# Patient Record
Sex: MALE | Race: WHITE | Employment: OTHER | URBAN - METROPOLITAN AREA
[De-identification: names, ages, dates, MRNs, and addresses within clinical notes are randomized per-mention and may not be internally consistent; named-entity substitution may affect disease eponyms.]

---

## 2017-01-18 ENCOUNTER — OFFICE VISIT (OUTPATIENT)
Dept: INTERNAL MEDICINE CLINIC | Age: 82
End: 2017-01-18

## 2017-01-18 VITALS
BODY MASS INDEX: 27 KG/M2 | DIASTOLIC BLOOD PRESSURE: 60 MMHG | WEIGHT: 168 LBS | HEART RATE: 62 BPM | OXYGEN SATURATION: 98 % | SYSTOLIC BLOOD PRESSURE: 130 MMHG | HEIGHT: 66 IN

## 2017-01-18 DIAGNOSIS — I48.20 CHRONIC ATRIAL FIBRILLATION (HCC): ICD-10-CM

## 2017-01-18 DIAGNOSIS — I10 ESSENTIAL HYPERTENSION, BENIGN: ICD-10-CM

## 2017-01-18 DIAGNOSIS — K21.9 GASTROESOPHAGEAL REFLUX DISEASE WITHOUT ESOPHAGITIS: ICD-10-CM

## 2017-01-18 DIAGNOSIS — I50.22 CHRONIC SYSTOLIC CONGESTIVE HEART FAILURE (HCC): Primary | ICD-10-CM

## 2017-01-18 DIAGNOSIS — G40.909 SEIZURE DISORDER (HCC): ICD-10-CM

## 2017-01-18 PROCEDURE — 1123F ACP DISCUSS/DSCN MKR DOCD: CPT | Performed by: INTERNAL MEDICINE

## 2017-01-18 PROCEDURE — 4040F PNEUMOC VAC/ADMIN/RCVD: CPT | Performed by: INTERNAL MEDICINE

## 2017-01-18 PROCEDURE — G8599 NO ASA/ANTIPLAT THER USE RNG: HCPCS | Performed by: INTERNAL MEDICINE

## 2017-01-18 PROCEDURE — G8420 CALC BMI NORM PARAMETERS: HCPCS | Performed by: INTERNAL MEDICINE

## 2017-01-18 PROCEDURE — 90670 PCV13 VACCINE IM: CPT | Performed by: INTERNAL MEDICINE

## 2017-01-18 PROCEDURE — G0009 ADMIN PNEUMOCOCCAL VACCINE: HCPCS | Performed by: INTERNAL MEDICINE

## 2017-01-18 PROCEDURE — G8427 DOCREV CUR MEDS BY ELIG CLIN: HCPCS | Performed by: INTERNAL MEDICINE

## 2017-01-18 PROCEDURE — 99214 OFFICE O/P EST MOD 30 MIN: CPT | Performed by: INTERNAL MEDICINE

## 2017-01-18 PROCEDURE — 1036F TOBACCO NON-USER: CPT | Performed by: INTERNAL MEDICINE

## 2017-01-18 PROCEDURE — G8484 FLU IMMUNIZE NO ADMIN: HCPCS | Performed by: INTERNAL MEDICINE

## 2017-01-18 RX ORDER — POTASSIUM CHLORIDE 1.5 G/1.77G
20 POWDER, FOR SOLUTION ORAL 2 TIMES DAILY
COMMUNITY
End: 2017-12-07 | Stop reason: SDUPTHER

## 2017-01-18 ASSESSMENT — ENCOUNTER SYMPTOMS
TROUBLE SWALLOWING: 0
WHEEZING: 0
GASTROINTESTINAL NEGATIVE: 1
CHEST TIGHTNESS: 0
COUGH: 0
SHORTNESS OF BREATH: 0

## 2017-01-25 RX ORDER — LEVETIRACETAM 500 MG/1
TABLET ORAL
Qty: 60 TABLET | Refills: 3 | Status: SHIPPED | OUTPATIENT
Start: 2017-01-25 | End: 2017-05-25 | Stop reason: SDUPTHER

## 2017-02-02 ENCOUNTER — TELEPHONE (OUTPATIENT)
Dept: INTERNAL MEDICINE CLINIC | Age: 82
End: 2017-02-02

## 2017-02-09 RX ORDER — ESCITALOPRAM OXALATE 20 MG/1
TABLET ORAL
Qty: 30 TABLET | Refills: 2 | Status: SHIPPED | OUTPATIENT
Start: 2017-02-09 | End: 2017-05-10 | Stop reason: SDUPTHER

## 2017-02-14 ENCOUNTER — ANTI-COAG VISIT (OUTPATIENT)
Dept: INTERNAL MEDICINE CLINIC | Age: 82
End: 2017-02-14

## 2017-03-22 ENCOUNTER — OFFICE VISIT (OUTPATIENT)
Dept: INTERNAL MEDICINE CLINIC | Age: 82
End: 2017-03-22

## 2017-03-22 VITALS
DIASTOLIC BLOOD PRESSURE: 60 MMHG | RESPIRATION RATE: 16 BRPM | WEIGHT: 174 LBS | OXYGEN SATURATION: 97 % | SYSTOLIC BLOOD PRESSURE: 130 MMHG | HEART RATE: 67 BPM | BODY MASS INDEX: 27.97 KG/M2 | HEIGHT: 66 IN

## 2017-03-22 DIAGNOSIS — I10 ESSENTIAL HYPERTENSION, BENIGN: Primary | ICD-10-CM

## 2017-03-22 DIAGNOSIS — I50.22 CHRONIC SYSTOLIC CONGESTIVE HEART FAILURE (HCC): ICD-10-CM

## 2017-03-22 DIAGNOSIS — I48.20 CHRONIC ATRIAL FIBRILLATION (HCC): ICD-10-CM

## 2017-03-22 DIAGNOSIS — R73.9 HYPERGLYCEMIA: ICD-10-CM

## 2017-03-22 DIAGNOSIS — G40.909 SEIZURE DISORDER (HCC): ICD-10-CM

## 2017-03-22 LAB
ANION GAP SERPL CALCULATED.3IONS-SCNC: 13 MMOL/L (ref 3–16)
BASOPHILS ABSOLUTE: 0.1 K/UL (ref 0–0.2)
BASOPHILS RELATIVE PERCENT: 0.6 %
BUN BLDV-MCNC: 22 MG/DL (ref 7–20)
CALCIUM SERPL-MCNC: 9.1 MG/DL (ref 8.3–10.6)
CHLORIDE BLD-SCNC: 100 MMOL/L (ref 99–110)
CO2: 27 MMOL/L (ref 21–32)
CREAT SERPL-MCNC: 0.7 MG/DL (ref 0.8–1.3)
EOSINOPHILS ABSOLUTE: 0.4 K/UL (ref 0–0.6)
EOSINOPHILS RELATIVE PERCENT: 3.9 %
GFR AFRICAN AMERICAN: >60
GFR NON-AFRICAN AMERICAN: >60
GLUCOSE BLD-MCNC: 117 MG/DL (ref 70–99)
HBA1C MFR BLD: 5.6 %
HCT VFR BLD CALC: 39.1 % (ref 40.5–52.5)
HEMOGLOBIN: 12.6 G/DL (ref 13.5–17.5)
LYMPHOCYTES ABSOLUTE: 2.3 K/UL (ref 1–5.1)
LYMPHOCYTES RELATIVE PERCENT: 23.9 %
MCH RBC QN AUTO: 30.6 PG (ref 26–34)
MCHC RBC AUTO-ENTMCNC: 32.2 G/DL (ref 31–36)
MCV RBC AUTO: 95 FL (ref 80–100)
MONOCYTES ABSOLUTE: 1.4 K/UL (ref 0–1.3)
MONOCYTES RELATIVE PERCENT: 13.9 %
NEUTROPHILS ABSOLUTE: 5.6 K/UL (ref 1.7–7.7)
NEUTROPHILS RELATIVE PERCENT: 57.7 %
PDW BLD-RTO: 15.3 % (ref 12.4–15.4)
PLATELET # BLD: 245 K/UL (ref 135–450)
PMV BLD AUTO: 8.3 FL (ref 5–10.5)
POTASSIUM SERPL-SCNC: 5 MMOL/L (ref 3.5–5.1)
RBC # BLD: 4.12 M/UL (ref 4.2–5.9)
SODIUM BLD-SCNC: 140 MMOL/L (ref 136–145)
TSH SERPL DL<=0.05 MIU/L-ACNC: 1.39 UIU/ML (ref 0.27–4.2)
WBC # BLD: 9.8 K/UL (ref 4–11)

## 2017-03-22 PROCEDURE — G8599 NO ASA/ANTIPLAT THER USE RNG: HCPCS | Performed by: INTERNAL MEDICINE

## 2017-03-22 PROCEDURE — 1036F TOBACCO NON-USER: CPT | Performed by: INTERNAL MEDICINE

## 2017-03-22 PROCEDURE — 99214 OFFICE O/P EST MOD 30 MIN: CPT | Performed by: INTERNAL MEDICINE

## 2017-03-22 PROCEDURE — G8484 FLU IMMUNIZE NO ADMIN: HCPCS | Performed by: INTERNAL MEDICINE

## 2017-03-22 PROCEDURE — G8420 CALC BMI NORM PARAMETERS: HCPCS | Performed by: INTERNAL MEDICINE

## 2017-03-22 PROCEDURE — G8427 DOCREV CUR MEDS BY ELIG CLIN: HCPCS | Performed by: INTERNAL MEDICINE

## 2017-03-22 PROCEDURE — 4040F PNEUMOC VAC/ADMIN/RCVD: CPT | Performed by: INTERNAL MEDICINE

## 2017-03-22 PROCEDURE — 1123F ACP DISCUSS/DSCN MKR DOCD: CPT | Performed by: INTERNAL MEDICINE

## 2017-03-22 PROCEDURE — 36415 COLL VENOUS BLD VENIPUNCTURE: CPT | Performed by: INTERNAL MEDICINE

## 2017-03-22 PROCEDURE — 83036 HEMOGLOBIN GLYCOSYLATED A1C: CPT | Performed by: INTERNAL MEDICINE

## 2017-03-22 ASSESSMENT — ENCOUNTER SYMPTOMS
SHORTNESS OF BREATH: 0
SORE THROAT: 0
COUGH: 1
CHEST TIGHTNESS: 0
GASTROINTESTINAL NEGATIVE: 1
WHEEZING: 0
SINUS PRESSURE: 0

## 2017-04-19 ENCOUNTER — TELEPHONE (OUTPATIENT)
Dept: INTERNAL MEDICINE CLINIC | Age: 82
End: 2017-04-19

## 2017-04-19 RX ORDER — OMEPRAZOLE 20 MG/1
20 CAPSULE, DELAYED RELEASE ORAL DAILY
Qty: 90 CAPSULE | Refills: 1 | Status: SHIPPED | OUTPATIENT
Start: 2017-04-19 | End: 2017-08-30 | Stop reason: SDUPTHER

## 2017-05-10 RX ORDER — ESCITALOPRAM OXALATE 20 MG/1
TABLET ORAL
Qty: 30 TABLET | Refills: 3 | Status: SHIPPED | OUTPATIENT
Start: 2017-05-10 | End: 2017-08-31 | Stop reason: SDUPTHER

## 2017-05-25 RX ORDER — LEVETIRACETAM 500 MG/1
TABLET ORAL
Qty: 60 TABLET | Refills: 3 | Status: SHIPPED | OUTPATIENT
Start: 2017-05-25 | End: 2017-10-05 | Stop reason: SDUPTHER

## 2017-05-26 ENCOUNTER — OFFICE VISIT (OUTPATIENT)
Dept: INTERNAL MEDICINE CLINIC | Age: 82
End: 2017-05-26

## 2017-05-26 VITALS
SYSTOLIC BLOOD PRESSURE: 120 MMHG | WEIGHT: 153 LBS | OXYGEN SATURATION: 98 % | RESPIRATION RATE: 16 BRPM | DIASTOLIC BLOOD PRESSURE: 70 MMHG | BODY MASS INDEX: 24.69 KG/M2 | HEART RATE: 60 BPM

## 2017-05-26 DIAGNOSIS — I10 ESSENTIAL HYPERTENSION, BENIGN: Primary | ICD-10-CM

## 2017-05-26 DIAGNOSIS — K21.9 GASTROESOPHAGEAL REFLUX DISEASE WITHOUT ESOPHAGITIS: ICD-10-CM

## 2017-05-26 DIAGNOSIS — I50.22 CHRONIC SYSTOLIC CONGESTIVE HEART FAILURE (HCC): ICD-10-CM

## 2017-05-26 DIAGNOSIS — I48.0 PAROXYSMAL ATRIAL FIBRILLATION (HCC): ICD-10-CM

## 2017-05-26 PROCEDURE — 4040F PNEUMOC VAC/ADMIN/RCVD: CPT | Performed by: INTERNAL MEDICINE

## 2017-05-26 PROCEDURE — G8427 DOCREV CUR MEDS BY ELIG CLIN: HCPCS | Performed by: INTERNAL MEDICINE

## 2017-05-26 PROCEDURE — G8420 CALC BMI NORM PARAMETERS: HCPCS | Performed by: INTERNAL MEDICINE

## 2017-05-26 PROCEDURE — 1036F TOBACCO NON-USER: CPT | Performed by: INTERNAL MEDICINE

## 2017-05-26 PROCEDURE — 1123F ACP DISCUSS/DSCN MKR DOCD: CPT | Performed by: INTERNAL MEDICINE

## 2017-05-26 PROCEDURE — 99214 OFFICE O/P EST MOD 30 MIN: CPT | Performed by: INTERNAL MEDICINE

## 2017-05-26 PROCEDURE — G8599 NO ASA/ANTIPLAT THER USE RNG: HCPCS | Performed by: INTERNAL MEDICINE

## 2017-05-26 ASSESSMENT — ENCOUNTER SYMPTOMS
WHEEZING: 0
SHORTNESS OF BREATH: 0
CHEST TIGHTNESS: 0
TROUBLE SWALLOWING: 0
SORE THROAT: 0
COUGH: 0
GASTROINTESTINAL NEGATIVE: 1

## 2017-06-21 ENCOUNTER — TELEPHONE (OUTPATIENT)
Dept: INTERNAL MEDICINE CLINIC | Age: 82
End: 2017-06-21

## 2017-07-24 ENCOUNTER — CARE COORDINATION (OUTPATIENT)
Dept: CARE COORDINATION | Age: 82
End: 2017-07-24

## 2017-08-01 ENCOUNTER — CARE COORDINATION (OUTPATIENT)
Dept: CARE COORDINATION | Age: 82
End: 2017-08-01

## 2017-08-02 ENCOUNTER — CARE COORDINATION (OUTPATIENT)
Dept: CARE COORDINATION | Age: 82
End: 2017-08-02

## 2017-08-10 ENCOUNTER — CARE COORDINATION (OUTPATIENT)
Dept: CARE COORDINATION | Age: 82
End: 2017-08-10

## 2017-08-11 ENCOUNTER — TELEPHONE (OUTPATIENT)
Dept: INTERNAL MEDICINE CLINIC | Age: 82
End: 2017-08-11

## 2017-08-11 DIAGNOSIS — H91.90 HEARING LOSS, UNSPECIFIED HEARING LOSS TYPE, UNSPECIFIED LATERALITY: Primary | ICD-10-CM

## 2017-08-18 ENCOUNTER — CARE COORDINATION (OUTPATIENT)
Dept: CARE COORDINATION | Age: 82
End: 2017-08-18

## 2017-08-30 ENCOUNTER — OFFICE VISIT (OUTPATIENT)
Dept: INTERNAL MEDICINE CLINIC | Age: 82
End: 2017-08-30

## 2017-08-30 VITALS
HEART RATE: 70 BPM | OXYGEN SATURATION: 96 % | SYSTOLIC BLOOD PRESSURE: 120 MMHG | HEIGHT: 67 IN | BODY MASS INDEX: 28.88 KG/M2 | WEIGHT: 184 LBS | DIASTOLIC BLOOD PRESSURE: 60 MMHG | RESPIRATION RATE: 16 BRPM

## 2017-08-30 DIAGNOSIS — G40.909 SEIZURE DISORDER (HCC): ICD-10-CM

## 2017-08-30 DIAGNOSIS — I50.22 CHRONIC SYSTOLIC CONGESTIVE HEART FAILURE (HCC): ICD-10-CM

## 2017-08-30 DIAGNOSIS — I25.10 CORONARY ARTERY DISEASE INVOLVING NATIVE CORONARY ARTERY OF NATIVE HEART WITHOUT ANGINA PECTORIS: ICD-10-CM

## 2017-08-30 DIAGNOSIS — S06.5XAA SDH (SUBDURAL HEMATOMA): ICD-10-CM

## 2017-08-30 DIAGNOSIS — I10 ESSENTIAL HYPERTENSION, BENIGN: ICD-10-CM

## 2017-08-30 DIAGNOSIS — I48.0 PAROXYSMAL ATRIAL FIBRILLATION (HCC): Primary | ICD-10-CM

## 2017-08-30 LAB
ANION GAP SERPL CALCULATED.3IONS-SCNC: 12 MMOL/L (ref 3–16)
BASOPHILS ABSOLUTE: 0.1 K/UL (ref 0–0.2)
BASOPHILS RELATIVE PERCENT: 0.9 %
BUN BLDV-MCNC: 21 MG/DL (ref 7–20)
CALCIUM SERPL-MCNC: 9.2 MG/DL (ref 8.3–10.6)
CHLORIDE BLD-SCNC: 104 MMOL/L (ref 99–110)
CO2: 27 MMOL/L (ref 21–32)
CREAT SERPL-MCNC: 0.7 MG/DL (ref 0.8–1.3)
EOSINOPHILS ABSOLUTE: 0.6 K/UL (ref 0–0.6)
EOSINOPHILS RELATIVE PERCENT: 5.8 %
GFR AFRICAN AMERICAN: >60
GFR NON-AFRICAN AMERICAN: >60
GLUCOSE BLD-MCNC: 97 MG/DL (ref 70–99)
HCT VFR BLD CALC: 38.1 % (ref 40.5–52.5)
HEMOGLOBIN: 12.7 G/DL (ref 13.5–17.5)
LYMPHOCYTES ABSOLUTE: 2.4 K/UL (ref 1–5.1)
LYMPHOCYTES RELATIVE PERCENT: 22.1 %
MAGNESIUM: 2.2 MG/DL (ref 1.8–2.4)
MCH RBC QN AUTO: 31.9 PG (ref 26–34)
MCHC RBC AUTO-ENTMCNC: 33.3 G/DL (ref 31–36)
MCV RBC AUTO: 95.8 FL (ref 80–100)
MONOCYTES ABSOLUTE: 1.4 K/UL (ref 0–1.3)
MONOCYTES RELATIVE PERCENT: 12.9 %
NEUTROPHILS ABSOLUTE: 6.3 K/UL (ref 1.7–7.7)
NEUTROPHILS RELATIVE PERCENT: 58.3 %
PDW BLD-RTO: 14.2 % (ref 12.4–15.4)
PLATELET # BLD: 250 K/UL (ref 135–450)
PMV BLD AUTO: 7.8 FL (ref 5–10.5)
POTASSIUM SERPL-SCNC: 5.3 MMOL/L (ref 3.5–5.1)
RBC # BLD: 3.98 M/UL (ref 4.2–5.9)
SODIUM BLD-SCNC: 143 MMOL/L (ref 136–145)
T4 FREE: 1.3 NG/DL (ref 0.9–1.8)
TSH SERPL DL<=0.05 MIU/L-ACNC: 1.45 UIU/ML (ref 0.27–4.2)
WBC # BLD: 10.7 K/UL (ref 4–11)

## 2017-08-30 PROCEDURE — G8599 NO ASA/ANTIPLAT THER USE RNG: HCPCS | Performed by: INTERNAL MEDICINE

## 2017-08-30 PROCEDURE — 36415 COLL VENOUS BLD VENIPUNCTURE: CPT | Performed by: INTERNAL MEDICINE

## 2017-08-30 PROCEDURE — G8419 CALC BMI OUT NRM PARAM NOF/U: HCPCS | Performed by: INTERNAL MEDICINE

## 2017-08-30 PROCEDURE — 1123F ACP DISCUSS/DSCN MKR DOCD: CPT | Performed by: INTERNAL MEDICINE

## 2017-08-30 PROCEDURE — G8427 DOCREV CUR MEDS BY ELIG CLIN: HCPCS | Performed by: INTERNAL MEDICINE

## 2017-08-30 PROCEDURE — G8510 SCR DEP NEG, NO PLAN REQD: HCPCS | Performed by: INTERNAL MEDICINE

## 2017-08-30 PROCEDURE — 99214 OFFICE O/P EST MOD 30 MIN: CPT | Performed by: INTERNAL MEDICINE

## 2017-08-30 PROCEDURE — 1036F TOBACCO NON-USER: CPT | Performed by: INTERNAL MEDICINE

## 2017-08-30 PROCEDURE — 4040F PNEUMOC VAC/ADMIN/RCVD: CPT | Performed by: INTERNAL MEDICINE

## 2017-08-30 RX ORDER — OMEPRAZOLE 20 MG/1
20 CAPSULE, DELAYED RELEASE ORAL DAILY
Qty: 90 CAPSULE | Refills: 1 | Status: SHIPPED | OUTPATIENT
Start: 2017-08-30 | End: 2018-04-17 | Stop reason: SDUPTHER

## 2017-08-30 ASSESSMENT — ENCOUNTER SYMPTOMS
SHORTNESS OF BREATH: 0
COUGH: 0
SORE THROAT: 0
CHEST TIGHTNESS: 0
WHEEZING: 0
VOICE CHANGE: 0
GASTROINTESTINAL NEGATIVE: 1

## 2017-08-30 ASSESSMENT — PATIENT HEALTH QUESTIONNAIRE - PHQ9
1. LITTLE INTEREST OR PLEASURE IN DOING THINGS: 0
SUM OF ALL RESPONSES TO PHQ QUESTIONS 1-9: 0
2. FEELING DOWN, DEPRESSED OR HOPELESS: 0
SUM OF ALL RESPONSES TO PHQ9 QUESTIONS 1 & 2: 0

## 2017-08-31 RX ORDER — ESCITALOPRAM OXALATE 20 MG/1
TABLET, FILM COATED ORAL
Qty: 30 TABLET | Refills: 3 | Status: SHIPPED | OUTPATIENT
Start: 2017-08-31 | End: 2018-01-03 | Stop reason: SDUPTHER

## 2017-09-06 ENCOUNTER — HOSPITAL ENCOUNTER (OUTPATIENT)
Dept: OTHER | Age: 82
Discharge: HOME | End: 2017-09-30
Attending: INTERNAL MEDICINE | Admitting: INTERNAL MEDICINE
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
## 2017-09-13 ENCOUNTER — TELEPHONE (OUTPATIENT)
Dept: INTERNAL MEDICINE CLINIC | Age: 82
End: 2017-09-13

## 2017-09-13 RX ORDER — METHYLPREDNISOLONE 4 MG/1
TABLET ORAL
Qty: 1 KIT | Refills: 0 | Status: SHIPPED | OUTPATIENT
Start: 2017-09-13 | End: 2017-09-19

## 2017-10-05 ENCOUNTER — TELEPHONE (OUTPATIENT)
Dept: INTERNAL MEDICINE CLINIC | Age: 82
End: 2017-10-05

## 2017-10-05 RX ORDER — LEVETIRACETAM 500 MG/1
TABLET ORAL
Qty: 60 TABLET | Status: CANCELLED | OUTPATIENT
Start: 2017-10-05

## 2017-10-05 RX ORDER — LEVETIRACETAM 500 MG/1
TABLET ORAL
Qty: 60 TABLET | Refills: 3 | Status: SHIPPED | OUTPATIENT
Start: 2017-10-05 | End: 2018-01-23 | Stop reason: SDUPTHER

## 2017-12-06 ENCOUNTER — OFFICE VISIT (OUTPATIENT)
Dept: INTERNAL MEDICINE CLINIC | Age: 82
End: 2017-12-06

## 2017-12-06 VITALS
HEART RATE: 86 BPM | OXYGEN SATURATION: 94 % | DIASTOLIC BLOOD PRESSURE: 70 MMHG | BODY MASS INDEX: 28.72 KG/M2 | RESPIRATION RATE: 16 BRPM | SYSTOLIC BLOOD PRESSURE: 120 MMHG | HEIGHT: 67 IN | WEIGHT: 183 LBS

## 2017-12-06 DIAGNOSIS — I10 ESSENTIAL HYPERTENSION, BENIGN: ICD-10-CM

## 2017-12-06 DIAGNOSIS — I48.20 CHRONIC ATRIAL FIBRILLATION (HCC): ICD-10-CM

## 2017-12-06 DIAGNOSIS — Z23 NEED FOR INFLUENZA VACCINATION: ICD-10-CM

## 2017-12-06 DIAGNOSIS — I50.22 CHRONIC SYSTOLIC CONGESTIVE HEART FAILURE (HCC): Primary | ICD-10-CM

## 2017-12-06 DIAGNOSIS — I25.10 CORONARY ARTERY DISEASE INVOLVING NATIVE CORONARY ARTERY OF NATIVE HEART WITHOUT ANGINA PECTORIS: ICD-10-CM

## 2017-12-06 DIAGNOSIS — K21.9 GASTROESOPHAGEAL REFLUX DISEASE WITHOUT ESOPHAGITIS: ICD-10-CM

## 2017-12-06 DIAGNOSIS — G40.909 SEIZURE DISORDER (HCC): ICD-10-CM

## 2017-12-06 LAB
ANION GAP SERPL CALCULATED.3IONS-SCNC: 12 MMOL/L (ref 3–16)
BASOPHILS ABSOLUTE: 0.1 K/UL (ref 0–0.2)
BASOPHILS RELATIVE PERCENT: 0.8 %
BUN BLDV-MCNC: 19 MG/DL (ref 7–20)
CALCIUM SERPL-MCNC: 9.1 MG/DL (ref 8.3–10.6)
CHLORIDE BLD-SCNC: 104 MMOL/L (ref 99–110)
CO2: 27 MMOL/L (ref 21–32)
CREAT SERPL-MCNC: 0.8 MG/DL (ref 0.8–1.3)
EOSINOPHILS ABSOLUTE: 0.4 K/UL (ref 0–0.6)
EOSINOPHILS RELATIVE PERCENT: 4 %
GFR AFRICAN AMERICAN: >60
GFR NON-AFRICAN AMERICAN: >60
GLUCOSE BLD-MCNC: 109 MG/DL (ref 70–99)
HCT VFR BLD CALC: 39.7 % (ref 40.5–52.5)
HEMOGLOBIN: 13.2 G/DL (ref 13.5–17.5)
LYMPHOCYTES ABSOLUTE: 2.2 K/UL (ref 1–5.1)
LYMPHOCYTES RELATIVE PERCENT: 23.7 %
MCH RBC QN AUTO: 31.1 PG (ref 26–34)
MCHC RBC AUTO-ENTMCNC: 33.1 G/DL (ref 31–36)
MCV RBC AUTO: 93.9 FL (ref 80–100)
MONOCYTES ABSOLUTE: 1.3 K/UL (ref 0–1.3)
MONOCYTES RELATIVE PERCENT: 14 %
NEUTROPHILS ABSOLUTE: 5.3 K/UL (ref 1.7–7.7)
NEUTROPHILS RELATIVE PERCENT: 57.5 %
PDW BLD-RTO: 14.1 % (ref 12.4–15.4)
PLATELET # BLD: 231 K/UL (ref 135–450)
PMV BLD AUTO: 8.4 FL (ref 5–10.5)
POTASSIUM SERPL-SCNC: 5.5 MMOL/L (ref 3.5–5.1)
RBC # BLD: 4.23 M/UL (ref 4.2–5.9)
SODIUM BLD-SCNC: 143 MMOL/L (ref 136–145)
WBC # BLD: 9.2 K/UL (ref 4–11)

## 2017-12-06 PROCEDURE — G8419 CALC BMI OUT NRM PARAM NOF/U: HCPCS | Performed by: INTERNAL MEDICINE

## 2017-12-06 PROCEDURE — 4040F PNEUMOC VAC/ADMIN/RCVD: CPT | Performed by: INTERNAL MEDICINE

## 2017-12-06 PROCEDURE — 1123F ACP DISCUSS/DSCN MKR DOCD: CPT | Performed by: INTERNAL MEDICINE

## 2017-12-06 PROCEDURE — G8599 NO ASA/ANTIPLAT THER USE RNG: HCPCS | Performed by: INTERNAL MEDICINE

## 2017-12-06 PROCEDURE — 99214 OFFICE O/P EST MOD 30 MIN: CPT | Performed by: INTERNAL MEDICINE

## 2017-12-06 PROCEDURE — G8427 DOCREV CUR MEDS BY ELIG CLIN: HCPCS | Performed by: INTERNAL MEDICINE

## 2017-12-06 PROCEDURE — 90662 IIV NO PRSV INCREASED AG IM: CPT | Performed by: INTERNAL MEDICINE

## 2017-12-06 PROCEDURE — G0008 ADMIN INFLUENZA VIRUS VAC: HCPCS | Performed by: INTERNAL MEDICINE

## 2017-12-06 PROCEDURE — 36415 COLL VENOUS BLD VENIPUNCTURE: CPT | Performed by: INTERNAL MEDICINE

## 2017-12-06 PROCEDURE — G8484 FLU IMMUNIZE NO ADMIN: HCPCS | Performed by: INTERNAL MEDICINE

## 2017-12-06 PROCEDURE — 1036F TOBACCO NON-USER: CPT | Performed by: INTERNAL MEDICINE

## 2017-12-06 ASSESSMENT — ENCOUNTER SYMPTOMS
SHORTNESS OF BREATH: 0
COUGH: 1
CHEST TIGHTNESS: 0
GASTROINTESTINAL NEGATIVE: 1
WHEEZING: 0
TROUBLE SWALLOWING: 0
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
## 2017-12-07 RX ORDER — POTASSIUM CHLORIDE 1.5 G/1.77G
20 POWDER, FOR SOLUTION ORAL DAILY
Qty: 1 EACH | Refills: 0
Start: 2017-12-07 | End: 2020-01-01

## 2018-01-03 RX ORDER — ESCITALOPRAM OXALATE 20 MG/1
TABLET ORAL
Qty: 30 TABLET | Refills: 3 | Status: SHIPPED | OUTPATIENT
Start: 2018-01-03 | End: 2018-05-08 | Stop reason: SDUPTHER

## 2018-01-23 RX ORDER — LEVETIRACETAM 500 MG/1
TABLET ORAL
Qty: 60 TABLET | Refills: 5 | Status: SHIPPED | OUTPATIENT
Start: 2018-01-23 | End: 2018-07-31 | Stop reason: SDUPTHER

## 2018-02-13 ENCOUNTER — OFFICE VISIT (OUTPATIENT)
Dept: INTERNAL MEDICINE CLINIC | Age: 83
End: 2018-02-13

## 2018-02-13 VITALS
DIASTOLIC BLOOD PRESSURE: 74 MMHG | SYSTOLIC BLOOD PRESSURE: 128 MMHG | RESPIRATION RATE: 16 BRPM | HEART RATE: 64 BPM | OXYGEN SATURATION: 93 % | HEIGHT: 67 IN | BODY MASS INDEX: 28.56 KG/M2 | WEIGHT: 182 LBS

## 2018-02-13 DIAGNOSIS — I50.22 CHRONIC SYSTOLIC CONGESTIVE HEART FAILURE (HCC): ICD-10-CM

## 2018-02-13 DIAGNOSIS — S40.022A CONTUSION OF LEFT UPPER EXTREMITY, INITIAL ENCOUNTER: ICD-10-CM

## 2018-02-13 DIAGNOSIS — M77.8 LEFT SHOULDER TENDONITIS: ICD-10-CM

## 2018-02-13 DIAGNOSIS — G89.29 CHRONIC BILATERAL LOW BACK PAIN WITHOUT SCIATICA: ICD-10-CM

## 2018-02-13 DIAGNOSIS — I10 ESSENTIAL HYPERTENSION, BENIGN: ICD-10-CM

## 2018-02-13 DIAGNOSIS — I48.0 PAROXYSMAL ATRIAL FIBRILLATION (HCC): Primary | ICD-10-CM

## 2018-02-13 DIAGNOSIS — M54.50 CHRONIC BILATERAL LOW BACK PAIN WITHOUT SCIATICA: ICD-10-CM

## 2018-02-13 PROBLEM — M54.9 CHRONIC BILATERAL BACK PAIN: Status: ACTIVE | Noted: 2018-02-13

## 2018-02-13 PROCEDURE — 99214 OFFICE O/P EST MOD 30 MIN: CPT | Performed by: INTERNAL MEDICINE

## 2018-02-13 PROCEDURE — G8419 CALC BMI OUT NRM PARAM NOF/U: HCPCS | Performed by: INTERNAL MEDICINE

## 2018-02-13 PROCEDURE — 4040F PNEUMOC VAC/ADMIN/RCVD: CPT | Performed by: INTERNAL MEDICINE

## 2018-02-13 PROCEDURE — 1123F ACP DISCUSS/DSCN MKR DOCD: CPT | Performed by: INTERNAL MEDICINE

## 2018-02-13 PROCEDURE — G8599 NO ASA/ANTIPLAT THER USE RNG: HCPCS | Performed by: INTERNAL MEDICINE

## 2018-02-13 PROCEDURE — 1036F TOBACCO NON-USER: CPT | Performed by: INTERNAL MEDICINE

## 2018-02-13 PROCEDURE — G8484 FLU IMMUNIZE NO ADMIN: HCPCS | Performed by: INTERNAL MEDICINE

## 2018-02-13 PROCEDURE — G8427 DOCREV CUR MEDS BY ELIG CLIN: HCPCS | Performed by: INTERNAL MEDICINE

## 2018-02-13 ASSESSMENT — ENCOUNTER SYMPTOMS
GASTROINTESTINAL NEGATIVE: 1
SHORTNESS OF BREATH: 0
WHEEZING: 0
COUGH: 1
TROUBLE SWALLOWING: 0
CHEST TIGHTNESS: 0

## 2018-02-13 NOTE — PROGRESS NOTES
Subjective:      Patient ID: Juan Lowry is a 80 y.o. male. HPI  Has low back pain on and off ans is better now and increases with movement and has no injury  Has left shoulder pain for 3 weeks and increases with movement and had no injury  Has a lump left arm where he was given flu vaccine 3 months back  Has swelling of legs for 2 weeks and has no pain and has no fever or chills and has no drainage  Has no chest pain wheezing orthopnea or pnd  Has no gi or gu symptoms  Has no chest pain and has dry cough  Review of Systems   Constitutional: Negative for activity change, appetite change and unexpected weight change. HENT: Negative for trouble swallowing. Respiratory: Positive for cough. Negative for chest tightness, shortness of breath and wheezing. Cardiovascular: Positive for leg swelling. Negative for palpitations. Gastrointestinal: Negative. Genitourinary: Negative. Neurological: Negative for dizziness, light-headedness and headaches. Hematological: Does not bruise/bleed easily. Objective:   Physical Exam   Constitutional: He is oriented to person, place, and time. No distress. Eyes: Conjunctivae and EOM are normal. Pupils are equal, round, and reactive to light. No scleral icterus. Neck: No JVD present. No thyromegaly present. Cardiovascular: Normal rate, regular rhythm and normal heart sounds. Exam reveals no gallop. No murmur heard. Pulmonary/Chest: No respiratory distress. He has no wheezes. Rales: crackles at bases and decreased bs left base. Musculoskeletal: Edema: has 2+ edema of both legs with midl erythema and has no warmth or tenderness and has no open skin areas. Ha slower iliolumbar tenderness  And ha sno spinal tenderness  Has decreased rom in all directions left shoulder and has tenderness anterior shoulder capsule and deltoid bursal area and not over ac joint   Neurological: He is alert and oriented to person, place, and time.    Nursing note and

## 2018-04-02 ENCOUNTER — OFFICE VISIT (OUTPATIENT)
Dept: INTERNAL MEDICINE CLINIC | Age: 83
End: 2018-04-02

## 2018-04-02 VITALS
BODY MASS INDEX: 28.25 KG/M2 | RESPIRATION RATE: 16 BRPM | OXYGEN SATURATION: 95 % | DIASTOLIC BLOOD PRESSURE: 50 MMHG | HEART RATE: 65 BPM | WEIGHT: 180 LBS | SYSTOLIC BLOOD PRESSURE: 90 MMHG | HEIGHT: 67 IN

## 2018-04-02 DIAGNOSIS — I48.20 CHRONIC ATRIAL FIBRILLATION (HCC): ICD-10-CM

## 2018-04-02 DIAGNOSIS — I10 ESSENTIAL HYPERTENSION, BENIGN: ICD-10-CM

## 2018-04-02 DIAGNOSIS — G40.909 SEIZURE DISORDER (HCC): ICD-10-CM

## 2018-04-02 DIAGNOSIS — I50.22 CHRONIC SYSTOLIC CONGESTIVE HEART FAILURE (HCC): Primary | ICD-10-CM

## 2018-04-02 LAB
ANION GAP SERPL CALCULATED.3IONS-SCNC: 11 MMOL/L (ref 3–16)
BUN BLDV-MCNC: 23 MG/DL (ref 7–20)
CALCIUM SERPL-MCNC: 8.9 MG/DL (ref 8.3–10.6)
CHLORIDE BLD-SCNC: 102 MMOL/L (ref 99–110)
CO2: 29 MMOL/L (ref 21–32)
CREAT SERPL-MCNC: 0.8 MG/DL (ref 0.8–1.3)
GFR AFRICAN AMERICAN: >60
GFR NON-AFRICAN AMERICAN: >60
GLUCOSE BLD-MCNC: 108 MG/DL (ref 70–99)
POTASSIUM SERPL-SCNC: 5.1 MMOL/L (ref 3.5–5.1)
SODIUM BLD-SCNC: 142 MMOL/L (ref 136–145)

## 2018-04-02 PROCEDURE — G8599 NO ASA/ANTIPLAT THER USE RNG: HCPCS | Performed by: INTERNAL MEDICINE

## 2018-04-02 PROCEDURE — 1123F ACP DISCUSS/DSCN MKR DOCD: CPT | Performed by: INTERNAL MEDICINE

## 2018-04-02 PROCEDURE — G8419 CALC BMI OUT NRM PARAM NOF/U: HCPCS | Performed by: INTERNAL MEDICINE

## 2018-04-02 PROCEDURE — 4040F PNEUMOC VAC/ADMIN/RCVD: CPT | Performed by: INTERNAL MEDICINE

## 2018-04-02 PROCEDURE — 99213 OFFICE O/P EST LOW 20 MIN: CPT | Performed by: INTERNAL MEDICINE

## 2018-04-02 PROCEDURE — 1036F TOBACCO NON-USER: CPT | Performed by: INTERNAL MEDICINE

## 2018-04-02 PROCEDURE — G8427 DOCREV CUR MEDS BY ELIG CLIN: HCPCS | Performed by: INTERNAL MEDICINE

## 2018-04-02 PROCEDURE — 36415 COLL VENOUS BLD VENIPUNCTURE: CPT | Performed by: INTERNAL MEDICINE

## 2018-04-02 RX ORDER — FUROSEMIDE 20 MG/1
TABLET ORAL
Qty: 1 TABLET | Refills: 0 | Status: SHIPPED
Start: 2018-04-02 | End: 2018-09-26 | Stop reason: DRUGHIGH

## 2018-04-02 RX ORDER — M-VIT,TX,IRON,MINS/CALC/FOLIC 27MG-0.4MG
1 TABLET ORAL DAILY
COMMUNITY

## 2018-04-02 ASSESSMENT — ENCOUNTER SYMPTOMS
SHORTNESS OF BREATH: 0
WHEEZING: 0
GASTROINTESTINAL NEGATIVE: 1
COUGH: 0
CHEST TIGHTNESS: 0

## 2018-04-17 RX ORDER — OMEPRAZOLE 20 MG/1
20 CAPSULE, DELAYED RELEASE ORAL DAILY
Qty: 90 CAPSULE | Refills: 1 | Status: SHIPPED | OUTPATIENT
Start: 2018-04-17 | End: 2018-10-16 | Stop reason: SDUPTHER

## 2018-05-08 RX ORDER — ESCITALOPRAM OXALATE 20 MG/1
TABLET ORAL
Qty: 30 TABLET | Refills: 3 | Status: SHIPPED | OUTPATIENT
Start: 2018-05-08 | End: 2018-09-05 | Stop reason: SDUPTHER

## 2018-07-06 ENCOUNTER — OFFICE VISIT (OUTPATIENT)
Dept: INTERNAL MEDICINE CLINIC | Age: 83
End: 2018-07-06

## 2018-07-06 VITALS
RESPIRATION RATE: 16 BRPM | DIASTOLIC BLOOD PRESSURE: 64 MMHG | BODY MASS INDEX: 27.62 KG/M2 | HEART RATE: 65 BPM | SYSTOLIC BLOOD PRESSURE: 112 MMHG | OXYGEN SATURATION: 93 % | HEIGHT: 67 IN | WEIGHT: 176 LBS

## 2018-07-06 DIAGNOSIS — I50.22 CHRONIC SYSTOLIC CONGESTIVE HEART FAILURE (HCC): ICD-10-CM

## 2018-07-06 DIAGNOSIS — I10 ESSENTIAL HYPERTENSION, BENIGN: ICD-10-CM

## 2018-07-06 DIAGNOSIS — K21.9 GASTROESOPHAGEAL REFLUX DISEASE WITHOUT ESOPHAGITIS: ICD-10-CM

## 2018-07-06 DIAGNOSIS — I48.20 CHRONIC ATRIAL FIBRILLATION (HCC): Primary | ICD-10-CM

## 2018-07-06 LAB
ANION GAP SERPL CALCULATED.3IONS-SCNC: 12 MMOL/L (ref 3–16)
BASOPHILS ABSOLUTE: 0.1 K/UL (ref 0–0.2)
BASOPHILS RELATIVE PERCENT: 0.8 %
BUN BLDV-MCNC: 23 MG/DL (ref 7–20)
CALCIUM SERPL-MCNC: 9.2 MG/DL (ref 8.3–10.6)
CHLORIDE BLD-SCNC: 100 MMOL/L (ref 99–110)
CO2: 29 MMOL/L (ref 21–32)
CREAT SERPL-MCNC: 0.9 MG/DL (ref 0.8–1.3)
EOSINOPHILS ABSOLUTE: 0.4 K/UL (ref 0–0.6)
EOSINOPHILS RELATIVE PERCENT: 5 %
GFR AFRICAN AMERICAN: >60
GFR NON-AFRICAN AMERICAN: >60
GLUCOSE BLD-MCNC: 118 MG/DL (ref 70–99)
HCT VFR BLD CALC: 38.4 % (ref 40.5–52.5)
HEMOGLOBIN: 13.2 G/DL (ref 13.5–17.5)
LYMPHOCYTES ABSOLUTE: 1.8 K/UL (ref 1–5.1)
LYMPHOCYTES RELATIVE PERCENT: 21.3 %
MCH RBC QN AUTO: 31.6 PG (ref 26–34)
MCHC RBC AUTO-ENTMCNC: 34.4 G/DL (ref 31–36)
MCV RBC AUTO: 92 FL (ref 80–100)
MONOCYTES ABSOLUTE: 1.2 K/UL (ref 0–1.3)
MONOCYTES RELATIVE PERCENT: 14.4 %
NEUTROPHILS ABSOLUTE: 5 K/UL (ref 1.7–7.7)
NEUTROPHILS RELATIVE PERCENT: 58.5 %
PDW BLD-RTO: 13.2 % (ref 12.4–15.4)
PLATELET # BLD: 239 K/UL (ref 135–450)
PMV BLD AUTO: 8 FL (ref 5–10.5)
POTASSIUM SERPL-SCNC: 4.1 MMOL/L (ref 3.5–5.1)
RBC # BLD: 4.17 M/UL (ref 4.2–5.9)
SODIUM BLD-SCNC: 141 MMOL/L (ref 136–145)
TSH SERPL DL<=0.05 MIU/L-ACNC: 0.98 UIU/ML (ref 0.27–4.2)
WBC # BLD: 8.6 K/UL (ref 4–11)

## 2018-07-06 PROCEDURE — 1123F ACP DISCUSS/DSCN MKR DOCD: CPT | Performed by: INTERNAL MEDICINE

## 2018-07-06 PROCEDURE — 36415 COLL VENOUS BLD VENIPUNCTURE: CPT | Performed by: INTERNAL MEDICINE

## 2018-07-06 PROCEDURE — 4040F PNEUMOC VAC/ADMIN/RCVD: CPT | Performed by: INTERNAL MEDICINE

## 2018-07-06 PROCEDURE — G8599 NO ASA/ANTIPLAT THER USE RNG: HCPCS | Performed by: INTERNAL MEDICINE

## 2018-07-06 PROCEDURE — 1036F TOBACCO NON-USER: CPT | Performed by: INTERNAL MEDICINE

## 2018-07-06 PROCEDURE — G8427 DOCREV CUR MEDS BY ELIG CLIN: HCPCS | Performed by: INTERNAL MEDICINE

## 2018-07-06 PROCEDURE — 99214 OFFICE O/P EST MOD 30 MIN: CPT | Performed by: INTERNAL MEDICINE

## 2018-07-06 PROCEDURE — G8419 CALC BMI OUT NRM PARAM NOF/U: HCPCS | Performed by: INTERNAL MEDICINE

## 2018-07-06 ASSESSMENT — ENCOUNTER SYMPTOMS
CHEST TIGHTNESS: 0
SHORTNESS OF BREATH: 0
COUGH: 0
TROUBLE SWALLOWING: 0
WHEEZING: 0
GASTROINTESTINAL NEGATIVE: 1

## 2018-07-06 NOTE — PROGRESS NOTES
Subjective:      Patient ID: Julissa Gordon is a 80 y.o. male.htn cad chf    HPI  Feels ok and at times gets depressed due to health and not able to drive  Has no orthopnea or pnd and has no cough wheezing or chest pains  Has no gi or gu symptoms  Walks with walker and helps wife with small house chores  Has no pain in legs when he walks  Review of Systems   Constitutional: Positive for appetite change (not as good-has no teeth). Negative for activity change and unexpected weight change. HENT: Negative for trouble swallowing. Respiratory: Negative for cough, chest tightness, shortness of breath and wheezing. Cardiovascular: Negative for chest pain, palpitations and leg swelling. Gastrointestinal: Negative. Abdominal pain: has no heart burns. Genitourinary: Negative. Neurological: Negative for dizziness, light-headedness and headaches. has rash over abdomen with itching and has it for few weeks     Objective:   Physical Exam   Constitutional: He is oriented to person, place, and time. No distress. Eyes: Conjunctivae and EOM are normal. Pupils are equal, round, and reactive to light. No scleral icterus. Neck: No JVD present. No thyromegaly present. Cardiovascular: Normal rate, regular rhythm and normal heart sounds. Exam reveals no gallop. No murmur heard. Pulmonary/Chest: No respiratory distress. He has no wheezes. He has rales (left base). Musculoskeletal: He exhibits no edema. Lymphadenopathy:     He has no cervical adenopathy. Neurological: He is alert and oriented to person, place, and time. Nursing note and vitals reviewed.   has dry scaly erythematous rash seen over his abdomen and in patches    Assessment:      Skin rash from dry skin -using cortisone -otc and helping and using once daily   htn stable   chf well controlled  Cad stable  Atrial fib and in regular rhythm and has no signs of tia   Plan:      Use cortisone ointment 2 times daily till healed  Continue current

## 2018-07-31 RX ORDER — LEVETIRACETAM 500 MG/1
TABLET ORAL
Qty: 60 TABLET | Refills: 5 | Status: SHIPPED | OUTPATIENT
Start: 2018-07-31 | End: 2019-01-29 | Stop reason: SDUPTHER

## 2018-08-21 ENCOUNTER — TELEPHONE (OUTPATIENT)
Dept: INTERNAL MEDICINE CLINIC | Age: 83
End: 2018-08-21

## 2018-09-05 RX ORDER — ESCITALOPRAM OXALATE 20 MG/1
TABLET ORAL
Qty: 30 TABLET | Refills: 3 | Status: SHIPPED | OUTPATIENT
Start: 2018-09-05 | End: 2019-01-02 | Stop reason: SDUPTHER

## 2018-09-26 ENCOUNTER — OFFICE VISIT (OUTPATIENT)
Dept: INTERNAL MEDICINE CLINIC | Age: 83
End: 2018-09-26
Payer: MEDICARE

## 2018-09-26 VITALS
RESPIRATION RATE: 16 BRPM | BODY MASS INDEX: 27.94 KG/M2 | OXYGEN SATURATION: 97 % | SYSTOLIC BLOOD PRESSURE: 120 MMHG | WEIGHT: 178 LBS | HEART RATE: 60 BPM | HEIGHT: 67 IN | DIASTOLIC BLOOD PRESSURE: 60 MMHG

## 2018-09-26 DIAGNOSIS — Z23 NEED FOR INFLUENZA VACCINATION: ICD-10-CM

## 2018-09-26 DIAGNOSIS — L97.921 ULCER OF LEFT LOWER EXTREMITY, LIMITED TO BREAKDOWN OF SKIN (HCC): ICD-10-CM

## 2018-09-26 DIAGNOSIS — I50.42 CHRONIC COMBINED SYSTOLIC AND DIASTOLIC CONGESTIVE HEART FAILURE (HCC): ICD-10-CM

## 2018-09-26 DIAGNOSIS — I48.20 CHRONIC ATRIAL FIBRILLATION (HCC): ICD-10-CM

## 2018-09-26 DIAGNOSIS — I10 ESSENTIAL HYPERTENSION, BENIGN: Primary | ICD-10-CM

## 2018-09-26 PROCEDURE — 4040F PNEUMOC VAC/ADMIN/RCVD: CPT | Performed by: INTERNAL MEDICINE

## 2018-09-26 PROCEDURE — G8419 CALC BMI OUT NRM PARAM NOF/U: HCPCS | Performed by: INTERNAL MEDICINE

## 2018-09-26 PROCEDURE — 1101F PT FALLS ASSESS-DOCD LE1/YR: CPT | Performed by: INTERNAL MEDICINE

## 2018-09-26 PROCEDURE — 90662 IIV NO PRSV INCREASED AG IM: CPT | Performed by: INTERNAL MEDICINE

## 2018-09-26 PROCEDURE — 99214 OFFICE O/P EST MOD 30 MIN: CPT | Performed by: INTERNAL MEDICINE

## 2018-09-26 PROCEDURE — 1036F TOBACCO NON-USER: CPT | Performed by: INTERNAL MEDICINE

## 2018-09-26 PROCEDURE — G8599 NO ASA/ANTIPLAT THER USE RNG: HCPCS | Performed by: INTERNAL MEDICINE

## 2018-09-26 PROCEDURE — G0444 DEPRESSION SCREEN ANNUAL: HCPCS | Performed by: INTERNAL MEDICINE

## 2018-09-26 PROCEDURE — 1123F ACP DISCUSS/DSCN MKR DOCD: CPT | Performed by: INTERNAL MEDICINE

## 2018-09-26 PROCEDURE — G0008 ADMIN INFLUENZA VIRUS VAC: HCPCS | Performed by: INTERNAL MEDICINE

## 2018-09-26 PROCEDURE — G8427 DOCREV CUR MEDS BY ELIG CLIN: HCPCS | Performed by: INTERNAL MEDICINE

## 2018-09-26 RX ORDER — FUROSEMIDE 20 MG/1
TABLET ORAL
Qty: 1 TABLET | Refills: 0 | Status: SHIPPED
Start: 2018-09-26 | End: 2020-01-01

## 2018-09-26 ASSESSMENT — PATIENT HEALTH QUESTIONNAIRE - PHQ9
4. FEELING TIRED OR HAVING LITTLE ENERGY: 3
7. TROUBLE CONCENTRATING ON THINGS, SUCH AS READING THE NEWSPAPER OR WATCHING TELEVISION: 0
5. POOR APPETITE OR OVEREATING: 3
1. LITTLE INTEREST OR PLEASURE IN DOING THINGS: 0
2. FEELING DOWN, DEPRESSED OR HOPELESS: 3
SUM OF ALL RESPONSES TO PHQ QUESTIONS 1-9: 15
6. FEELING BAD ABOUT YOURSELF - OR THAT YOU ARE A FAILURE OR HAVE LET YOURSELF OR YOUR FAMILY DOWN: 3
8. MOVING OR SPEAKING SO SLOWLY THAT OTHER PEOPLE COULD HAVE NOTICED. OR THE OPPOSITE, BEING SO FIGETY OR RESTLESS THAT YOU HAVE BEEN MOVING AROUND A LOT MORE THAN USUAL: 0
SUM OF ALL RESPONSES TO PHQ QUESTIONS 1-9: 15
10. IF YOU CHECKED OFF ANY PROBLEMS, HOW DIFFICULT HAVE THESE PROBLEMS MADE IT FOR YOU TO DO YOUR WORK, TAKE CARE OF THINGS AT HOME, OR GET ALONG WITH OTHER PEOPLE: 0
SUM OF ALL RESPONSES TO PHQ9 QUESTIONS 1 & 2: 3
3. TROUBLE FALLING OR STAYING ASLEEP: 3
9. THOUGHTS THAT YOU WOULD BE BETTER OFF DEAD, OR OF HURTING YOURSELF: 0

## 2018-09-26 ASSESSMENT — ENCOUNTER SYMPTOMS
COUGH: 1
CHEST TIGHTNESS: 0
GASTROINTESTINAL NEGATIVE: 1
SHORTNESS OF BREATH: 0
WHEEZING: 0

## 2018-09-26 NOTE — PROGRESS NOTES
Vaccine Information Sheet, \"Influenza - Inactivated\"  given to Benedict Lowery, or parent/legal guardian of  Benedict Lowery and verbalized understanding. Patient responses:    Have you ever had a reaction to a flu vaccine? No  Are you able to eat eggs without adverse effects? Yes  Do you have any current illness? No  Have you ever had Guillian Munson Syndrome? No    Flu vaccine given per order. Please see immunization tab.

## 2018-09-29 LAB
GRAM STAIN RESULT: ABNORMAL
ORGANISM: ABNORMAL
WOUND/ABSCESS: ABNORMAL
WOUND/ABSCESS: ABNORMAL

## 2018-10-16 RX ORDER — OMEPRAZOLE 20 MG/1
20 CAPSULE, DELAYED RELEASE ORAL DAILY
Qty: 90 CAPSULE | Refills: 1 | Status: SHIPPED | OUTPATIENT
Start: 2018-10-16 | End: 2019-04-09 | Stop reason: SDUPTHER

## 2018-12-17 ENCOUNTER — TELEPHONE (OUTPATIENT)
Dept: INTERNAL MEDICINE CLINIC | Age: 83
End: 2018-12-17

## 2018-12-17 NOTE — TELEPHONE ENCOUNTER
He need to have Vaseline or Eucerin lotion to has back and dry skin areas 3-4 times a day and dryness is what is causing his itching and is common in winter months due to heat in house  A humidifier will also help

## 2018-12-17 NOTE — TELEPHONE ENCOUNTER
Spoke to pt wife and she stated pt has dry skin and no rash. Pt has been itching for 3 weeks.  Please Advise

## 2019-01-01 ENCOUNTER — OFFICE VISIT (OUTPATIENT)
Dept: INTERNAL MEDICINE CLINIC | Age: 84
End: 2019-01-01
Payer: MEDICARE

## 2019-01-01 VITALS
HEIGHT: 67 IN | WEIGHT: 180 LBS | DIASTOLIC BLOOD PRESSURE: 60 MMHG | SYSTOLIC BLOOD PRESSURE: 120 MMHG | BODY MASS INDEX: 28.25 KG/M2 | OXYGEN SATURATION: 98 % | HEART RATE: 65 BPM

## 2019-01-01 DIAGNOSIS — I50.42 CHRONIC COMBINED SYSTOLIC AND DIASTOLIC CONGESTIVE HEART FAILURE (HCC): Primary | ICD-10-CM

## 2019-01-01 DIAGNOSIS — L23.9 ALLERGIC DERMATITIS: ICD-10-CM

## 2019-01-01 DIAGNOSIS — L97.921 ULCER OF LEFT LOWER EXTREMITY, LIMITED TO BREAKDOWN OF SKIN (HCC): ICD-10-CM

## 2019-01-01 DIAGNOSIS — I25.10 CORONARY ARTERY DISEASE INVOLVING NATIVE CORONARY ARTERY OF NATIVE HEART WITHOUT ANGINA PECTORIS: ICD-10-CM

## 2019-01-01 DIAGNOSIS — I10 ESSENTIAL HYPERTENSION, BENIGN: ICD-10-CM

## 2019-01-01 PROCEDURE — 1123F ACP DISCUSS/DSCN MKR DOCD: CPT | Performed by: INTERNAL MEDICINE

## 2019-01-01 PROCEDURE — 99214 OFFICE O/P EST MOD 30 MIN: CPT | Performed by: INTERNAL MEDICINE

## 2019-01-01 PROCEDURE — G8482 FLU IMMUNIZE ORDER/ADMIN: HCPCS | Performed by: INTERNAL MEDICINE

## 2019-01-01 PROCEDURE — G8417 CALC BMI ABV UP PARAM F/U: HCPCS | Performed by: INTERNAL MEDICINE

## 2019-01-01 PROCEDURE — 4040F PNEUMOC VAC/ADMIN/RCVD: CPT | Performed by: INTERNAL MEDICINE

## 2019-01-01 PROCEDURE — G8599 NO ASA/ANTIPLAT THER USE RNG: HCPCS | Performed by: INTERNAL MEDICINE

## 2019-01-01 PROCEDURE — 1036F TOBACCO NON-USER: CPT | Performed by: INTERNAL MEDICINE

## 2019-01-01 PROCEDURE — G8427 DOCREV CUR MEDS BY ELIG CLIN: HCPCS | Performed by: INTERNAL MEDICINE

## 2019-01-01 RX ORDER — MOMETASONE FUROATE 1 MG/G
OINTMENT TOPICAL
Qty: 45 G | Refills: 1 | Status: SHIPPED | OUTPATIENT
Start: 2019-01-01 | End: 2020-01-01

## 2019-01-01 ASSESSMENT — ENCOUNTER SYMPTOMS: TROUBLE SWALLOWING: 0

## 2019-01-02 RX ORDER — ESCITALOPRAM OXALATE 20 MG/1
TABLET ORAL
Qty: 30 TABLET | Refills: 3 | Status: SHIPPED | OUTPATIENT
Start: 2019-01-02 | End: 2019-05-07 | Stop reason: SDUPTHER

## 2019-01-23 ENCOUNTER — TELEPHONE (OUTPATIENT)
Dept: INTERNAL MEDICINE CLINIC | Age: 84
End: 2019-01-23

## 2019-01-29 RX ORDER — LEVETIRACETAM 500 MG/1
TABLET ORAL
Qty: 60 TABLET | Refills: 5 | Status: SHIPPED | OUTPATIENT
Start: 2019-01-29 | End: 2019-07-30 | Stop reason: SDUPTHER

## 2019-02-11 ENCOUNTER — OFFICE VISIT (OUTPATIENT)
Dept: INTERNAL MEDICINE CLINIC | Age: 84
End: 2019-02-11
Payer: MEDICARE

## 2019-02-11 ENCOUNTER — TELEPHONE (OUTPATIENT)
Dept: INTERNAL MEDICINE CLINIC | Age: 84
End: 2019-02-11

## 2019-02-11 VITALS
RESPIRATION RATE: 16 BRPM | OXYGEN SATURATION: 96 % | BODY MASS INDEX: 28.22 KG/M2 | WEIGHT: 179.8 LBS | DIASTOLIC BLOOD PRESSURE: 66 MMHG | SYSTOLIC BLOOD PRESSURE: 120 MMHG | HEART RATE: 65 BPM | HEIGHT: 67 IN

## 2019-02-11 DIAGNOSIS — I10 ESSENTIAL HYPERTENSION, BENIGN: ICD-10-CM

## 2019-02-11 DIAGNOSIS — I50.42 CHRONIC COMBINED SYSTOLIC AND DIASTOLIC CONGESTIVE HEART FAILURE (HCC): ICD-10-CM

## 2019-02-11 DIAGNOSIS — I48.0 PAROXYSMAL ATRIAL FIBRILLATION (HCC): Primary | ICD-10-CM

## 2019-02-11 DIAGNOSIS — G40.909 SEIZURE DISORDER (HCC): ICD-10-CM

## 2019-02-11 DIAGNOSIS — L97.921 ULCER OF LEFT LOWER EXTREMITY, LIMITED TO BREAKDOWN OF SKIN (HCC): ICD-10-CM

## 2019-02-11 DIAGNOSIS — I87.2 STASIS DERMATITIS OF BOTH LEGS: ICD-10-CM

## 2019-02-11 LAB
ANION GAP SERPL CALCULATED.3IONS-SCNC: 11 MMOL/L (ref 3–16)
BASOPHILS ABSOLUTE: 0.1 K/UL (ref 0–0.2)
BASOPHILS RELATIVE PERCENT: 0.7 %
BUN BLDV-MCNC: 25 MG/DL (ref 7–20)
CALCIUM SERPL-MCNC: 9.2 MG/DL (ref 8.3–10.6)
CHLORIDE BLD-SCNC: 99 MMOL/L (ref 99–110)
CO2: 29 MMOL/L (ref 21–32)
CREAT SERPL-MCNC: 1 MG/DL (ref 0.8–1.3)
EOSINOPHILS ABSOLUTE: 0.8 K/UL (ref 0–0.6)
EOSINOPHILS RELATIVE PERCENT: 8.3 %
GFR AFRICAN AMERICAN: >60
GFR NON-AFRICAN AMERICAN: >60
GLUCOSE BLD-MCNC: 98 MG/DL (ref 70–99)
HCT VFR BLD CALC: 41.1 % (ref 40.5–52.5)
HEMOGLOBIN: 13.7 G/DL (ref 13.5–17.5)
LYMPHOCYTES ABSOLUTE: 2.1 K/UL (ref 1–5.1)
LYMPHOCYTES RELATIVE PERCENT: 23.3 %
MCH RBC QN AUTO: 30.6 PG (ref 26–34)
MCHC RBC AUTO-ENTMCNC: 33.2 G/DL (ref 31–36)
MCV RBC AUTO: 92.1 FL (ref 80–100)
MONOCYTES ABSOLUTE: 1.5 K/UL (ref 0–1.3)
MONOCYTES RELATIVE PERCENT: 16.3 %
NEUTROPHILS ABSOLUTE: 4.7 K/UL (ref 1.7–7.7)
NEUTROPHILS RELATIVE PERCENT: 51.4 %
PDW BLD-RTO: 13.8 % (ref 12.4–15.4)
PLATELET # BLD: 250 K/UL (ref 135–450)
PMV BLD AUTO: 8.3 FL (ref 5–10.5)
POTASSIUM SERPL-SCNC: 4.9 MMOL/L (ref 3.5–5.1)
RBC # BLD: 4.46 M/UL (ref 4.2–5.9)
SODIUM BLD-SCNC: 139 MMOL/L (ref 136–145)
TSH SERPL DL<=0.05 MIU/L-ACNC: 1.66 UIU/ML (ref 0.27–4.2)
WBC # BLD: 9.1 K/UL (ref 4–11)

## 2019-02-11 PROCEDURE — G8510 SCR DEP NEG, NO PLAN REQD: HCPCS | Performed by: INTERNAL MEDICINE

## 2019-02-11 PROCEDURE — 36415 COLL VENOUS BLD VENIPUNCTURE: CPT | Performed by: INTERNAL MEDICINE

## 2019-02-11 PROCEDURE — 4040F PNEUMOC VAC/ADMIN/RCVD: CPT | Performed by: INTERNAL MEDICINE

## 2019-02-11 PROCEDURE — G8427 DOCREV CUR MEDS BY ELIG CLIN: HCPCS | Performed by: INTERNAL MEDICINE

## 2019-02-11 PROCEDURE — G8482 FLU IMMUNIZE ORDER/ADMIN: HCPCS | Performed by: INTERNAL MEDICINE

## 2019-02-11 PROCEDURE — G8599 NO ASA/ANTIPLAT THER USE RNG: HCPCS | Performed by: INTERNAL MEDICINE

## 2019-02-11 PROCEDURE — 99214 OFFICE O/P EST MOD 30 MIN: CPT | Performed by: INTERNAL MEDICINE

## 2019-02-11 PROCEDURE — 1101F PT FALLS ASSESS-DOCD LE1/YR: CPT | Performed by: INTERNAL MEDICINE

## 2019-02-11 PROCEDURE — 1036F TOBACCO NON-USER: CPT | Performed by: INTERNAL MEDICINE

## 2019-02-11 PROCEDURE — 1123F ACP DISCUSS/DSCN MKR DOCD: CPT | Performed by: INTERNAL MEDICINE

## 2019-02-11 PROCEDURE — G8419 CALC BMI OUT NRM PARAM NOF/U: HCPCS | Performed by: INTERNAL MEDICINE

## 2019-02-11 RX ORDER — MOMETASONE FUROATE 1 MG/G
OINTMENT TOPICAL
Qty: 60 G | Refills: 1 | Status: SHIPPED | OUTPATIENT
Start: 2019-02-11 | End: 2019-03-25 | Stop reason: SDUPTHER

## 2019-02-11 ASSESSMENT — PATIENT HEALTH QUESTIONNAIRE - PHQ9
SUM OF ALL RESPONSES TO PHQ9 QUESTIONS 1 & 2: 0
1. LITTLE INTEREST OR PLEASURE IN DOING THINGS: 0
SUM OF ALL RESPONSES TO PHQ QUESTIONS 1-9: 0
2. FEELING DOWN, DEPRESSED OR HOPELESS: 0
SUM OF ALL RESPONSES TO PHQ QUESTIONS 1-9: 0

## 2019-02-11 ASSESSMENT — ENCOUNTER SYMPTOMS
WHEEZING: 0
CHEST TIGHTNESS: 0
SHORTNESS OF BREATH: 0
COUGH: 0
GASTROINTESTINAL NEGATIVE: 1

## 2019-03-11 ENCOUNTER — TELEPHONE (OUTPATIENT)
Dept: INTERNAL MEDICINE CLINIC | Age: 84
End: 2019-03-11

## 2019-03-15 ENCOUNTER — OFFICE VISIT (OUTPATIENT)
Dept: INTERNAL MEDICINE CLINIC | Age: 84
End: 2019-03-15
Payer: MEDICARE

## 2019-03-15 VITALS
SYSTOLIC BLOOD PRESSURE: 131 MMHG | HEIGHT: 67 IN | BODY MASS INDEX: 28.09 KG/M2 | HEART RATE: 63 BPM | WEIGHT: 179 LBS | RESPIRATION RATE: 16 BRPM | DIASTOLIC BLOOD PRESSURE: 70 MMHG

## 2019-03-15 DIAGNOSIS — R10.12 LEFT UPPER QUADRANT PAIN: Primary | ICD-10-CM

## 2019-03-15 DIAGNOSIS — B02.9 HERPES ZOSTER WITHOUT COMPLICATION: ICD-10-CM

## 2019-03-15 LAB
A/G RATIO: 1.5 (ref 1.1–2.2)
ALBUMIN SERPL-MCNC: 4 G/DL (ref 3.4–5)
ALP BLD-CCNC: 79 U/L (ref 40–129)
ALT SERPL-CCNC: 12 U/L (ref 10–40)
ANION GAP SERPL CALCULATED.3IONS-SCNC: 12 MMOL/L (ref 3–16)
AST SERPL-CCNC: 17 U/L (ref 15–37)
BILIRUB SERPL-MCNC: 0.4 MG/DL (ref 0–1)
BUN BLDV-MCNC: 25 MG/DL (ref 7–20)
CALCIUM SERPL-MCNC: 9.5 MG/DL (ref 8.3–10.6)
CHLORIDE BLD-SCNC: 102 MMOL/L (ref 99–110)
CO2: 29 MMOL/L (ref 21–32)
CREAT SERPL-MCNC: 0.9 MG/DL (ref 0.8–1.3)
GFR AFRICAN AMERICAN: >60
GFR NON-AFRICAN AMERICAN: >60
GLOBULIN: 2.7 G/DL
GLUCOSE BLD-MCNC: 110 MG/DL (ref 70–99)
HCT VFR BLD CALC: 42.3 % (ref 40.5–52.5)
HEMOGLOBIN: 14.1 G/DL (ref 13.5–17.5)
LIPASE: 23 U/L (ref 13–60)
MCH RBC QN AUTO: 31 PG (ref 26–34)
MCHC RBC AUTO-ENTMCNC: 33.3 G/DL (ref 31–36)
MCV RBC AUTO: 93.1 FL (ref 80–100)
PDW BLD-RTO: 14.3 % (ref 12.4–15.4)
PLATELET # BLD: 236 K/UL (ref 135–450)
PMV BLD AUTO: 7.5 FL (ref 5–10.5)
POTASSIUM SERPL-SCNC: 4.3 MMOL/L (ref 3.5–5.1)
RBC # BLD: 4.55 M/UL (ref 4.2–5.9)
SODIUM BLD-SCNC: 143 MMOL/L (ref 136–145)
TOTAL PROTEIN: 6.7 G/DL (ref 6.4–8.2)
WBC # BLD: 7.6 K/UL (ref 4–11)

## 2019-03-15 PROCEDURE — 99214 OFFICE O/P EST MOD 30 MIN: CPT | Performed by: NURSE PRACTITIONER

## 2019-03-15 PROCEDURE — G8599 NO ASA/ANTIPLAT THER USE RNG: HCPCS | Performed by: NURSE PRACTITIONER

## 2019-03-15 PROCEDURE — 1036F TOBACCO NON-USER: CPT | Performed by: NURSE PRACTITIONER

## 2019-03-15 PROCEDURE — 1101F PT FALLS ASSESS-DOCD LE1/YR: CPT | Performed by: NURSE PRACTITIONER

## 2019-03-15 PROCEDURE — G8427 DOCREV CUR MEDS BY ELIG CLIN: HCPCS | Performed by: NURSE PRACTITIONER

## 2019-03-15 PROCEDURE — 1123F ACP DISCUSS/DSCN MKR DOCD: CPT | Performed by: NURSE PRACTITIONER

## 2019-03-15 PROCEDURE — G8419 CALC BMI OUT NRM PARAM NOF/U: HCPCS | Performed by: NURSE PRACTITIONER

## 2019-03-15 PROCEDURE — G8482 FLU IMMUNIZE ORDER/ADMIN: HCPCS | Performed by: NURSE PRACTITIONER

## 2019-03-15 PROCEDURE — 36415 COLL VENOUS BLD VENIPUNCTURE: CPT | Performed by: NURSE PRACTITIONER

## 2019-03-15 PROCEDURE — 4040F PNEUMOC VAC/ADMIN/RCVD: CPT | Performed by: NURSE PRACTITIONER

## 2019-03-15 RX ORDER — ACYCLOVIR 800 MG/1
800 TABLET ORAL
Qty: 50 TABLET | Refills: 0 | Status: SHIPPED | OUTPATIENT
Start: 2019-03-15 | End: 2019-03-25

## 2019-03-15 ASSESSMENT — ENCOUNTER SYMPTOMS
VOMITING: 0
NAUSEA: 0
CONSTIPATION: 0
CHEST TIGHTNESS: 0
BACK PAIN: 1
SHORTNESS OF BREATH: 0
DIARRHEA: 0
CHOKING: 0
ABDOMINAL PAIN: 1

## 2019-03-25 RX ORDER — MOMETASONE FUROATE 1 MG/G
OINTMENT TOPICAL
Qty: 45 G | Refills: 2 | Status: SHIPPED | OUTPATIENT
Start: 2019-03-25 | End: 2019-07-30 | Stop reason: SDUPTHER

## 2019-04-03 ENCOUNTER — OFFICE VISIT (OUTPATIENT)
Dept: INTERNAL MEDICINE CLINIC | Age: 84
End: 2019-04-03
Payer: MEDICARE

## 2019-04-03 VITALS
BODY MASS INDEX: 27.15 KG/M2 | OXYGEN SATURATION: 98 % | WEIGHT: 173 LBS | DIASTOLIC BLOOD PRESSURE: 66 MMHG | RESPIRATION RATE: 16 BRPM | HEIGHT: 67 IN | SYSTOLIC BLOOD PRESSURE: 126 MMHG | HEART RATE: 62 BPM

## 2019-04-03 DIAGNOSIS — B02.9 HERPES ZOSTER WITHOUT COMPLICATION: ICD-10-CM

## 2019-04-03 PROCEDURE — G8599 NO ASA/ANTIPLAT THER USE RNG: HCPCS | Performed by: INTERNAL MEDICINE

## 2019-04-03 PROCEDURE — G8419 CALC BMI OUT NRM PARAM NOF/U: HCPCS | Performed by: INTERNAL MEDICINE

## 2019-04-03 PROCEDURE — 1036F TOBACCO NON-USER: CPT | Performed by: INTERNAL MEDICINE

## 2019-04-03 PROCEDURE — 99212 OFFICE O/P EST SF 10 MIN: CPT | Performed by: INTERNAL MEDICINE

## 2019-04-03 PROCEDURE — G8427 DOCREV CUR MEDS BY ELIG CLIN: HCPCS | Performed by: INTERNAL MEDICINE

## 2019-04-03 PROCEDURE — 1123F ACP DISCUSS/DSCN MKR DOCD: CPT | Performed by: INTERNAL MEDICINE

## 2019-04-03 PROCEDURE — 4040F PNEUMOC VAC/ADMIN/RCVD: CPT | Performed by: INTERNAL MEDICINE

## 2019-04-03 NOTE — PATIENT INSTRUCTIONS
As pain is mild ,cam give tylenol 2 tabs 2-3 times daily and if severe ,she will call me and will start gabapentin then

## 2019-04-03 NOTE — PROGRESS NOTES
Subjective:      Patient ID: Cesar Hyatt is a 80 y.o. male.     HPI  Has been treated  for shingles left side and lesions are better and has pain all the time in this area and advil helped pain and at times pain is stabbing and none past week  Pain is very mild and like an ache  Review of Systems    Objective:   Physical Exam  Skin  Lesions have healed -over left side of chest/abdomen  Assessment:      Herpes- zoster with mild neuralgia      Plan:      As pain is mild ,cam give tylenol 2 tabs 2-3 times daily and if severe ,she will call me and will start gabapentin then        Meghan Narayanan MD

## 2019-04-09 RX ORDER — OMEPRAZOLE 20 MG/1
20 CAPSULE, DELAYED RELEASE ORAL DAILY
Qty: 90 CAPSULE | Refills: 1 | Status: SHIPPED | OUTPATIENT
Start: 2019-04-09 | End: 2019-10-08 | Stop reason: SDUPTHER

## 2019-05-08 RX ORDER — ESCITALOPRAM OXALATE 20 MG/1
TABLET ORAL
Qty: 30 TABLET | Refills: 3 | Status: SHIPPED | OUTPATIENT
Start: 2019-05-08 | End: 2019-09-03 | Stop reason: SDUPTHER

## 2019-05-14 ENCOUNTER — TELEPHONE (OUTPATIENT)
Dept: INTERNAL MEDICINE CLINIC | Age: 84
End: 2019-05-14

## 2019-05-14 RX ORDER — ASCORBIC ACID 500 MG
500 TABLET ORAL DAILY
Status: ON HOLD | COMMUNITY
End: 2020-01-01 | Stop reason: HOSPADM

## 2019-05-14 RX ORDER — MULTIVIT WITH MINERALS/LUTEIN
1000 TABLET ORAL DAILY
Status: ON HOLD | COMMUNITY
End: 2020-01-01 | Stop reason: HOSPADM

## 2019-05-14 RX ORDER — CALCIUM CARBONATE 500(1250)
500 TABLET ORAL DAILY
COMMUNITY
End: 2020-01-01

## 2019-05-14 NOTE — TELEPHONE ENCOUNTER
Dr. Aileen Logan:    Notes: per HIPAA, talked to wife    In planning for that visit I have completed the following pre-visit planning:     Pre-Visit Planning Checklist:  Patient contacted: yes  Verified patient by name and date of birth: yes    Health Maintenance items reviewed:    No pre-visit planning health maintenance topics to review at this time    Preliminary Medication Reconciliation: was performed.     Nataliya Falcon MA  Pre-Services Specialist

## 2019-05-15 ENCOUNTER — OFFICE VISIT (OUTPATIENT)
Dept: INTERNAL MEDICINE CLINIC | Age: 84
End: 2019-05-15
Payer: MEDICARE

## 2019-05-15 VITALS
DIASTOLIC BLOOD PRESSURE: 60 MMHG | SYSTOLIC BLOOD PRESSURE: 110 MMHG | BODY MASS INDEX: 27.1 KG/M2 | HEART RATE: 60 BPM | WEIGHT: 173 LBS | OXYGEN SATURATION: 96 %

## 2019-05-15 DIAGNOSIS — I25.10 CORONARY ARTERY DISEASE INVOLVING NATIVE CORONARY ARTERY OF NATIVE HEART WITHOUT ANGINA PECTORIS: ICD-10-CM

## 2019-05-15 DIAGNOSIS — I10 ESSENTIAL HYPERTENSION, BENIGN: ICD-10-CM

## 2019-05-15 DIAGNOSIS — I50.42 CHRONIC COMBINED SYSTOLIC AND DIASTOLIC CONGESTIVE HEART FAILURE (HCC): Primary | ICD-10-CM

## 2019-05-15 DIAGNOSIS — I48.20 CHRONIC ATRIAL FIBRILLATION (HCC): ICD-10-CM

## 2019-05-15 PROCEDURE — 1123F ACP DISCUSS/DSCN MKR DOCD: CPT | Performed by: INTERNAL MEDICINE

## 2019-05-15 PROCEDURE — G0009 ADMIN PNEUMOCOCCAL VACCINE: HCPCS | Performed by: INTERNAL MEDICINE

## 2019-05-15 PROCEDURE — G8419 CALC BMI OUT NRM PARAM NOF/U: HCPCS | Performed by: INTERNAL MEDICINE

## 2019-05-15 PROCEDURE — G8599 NO ASA/ANTIPLAT THER USE RNG: HCPCS | Performed by: INTERNAL MEDICINE

## 2019-05-15 PROCEDURE — 90732 PPSV23 VACC 2 YRS+ SUBQ/IM: CPT | Performed by: INTERNAL MEDICINE

## 2019-05-15 PROCEDURE — 4040F PNEUMOC VAC/ADMIN/RCVD: CPT | Performed by: INTERNAL MEDICINE

## 2019-05-15 PROCEDURE — G8427 DOCREV CUR MEDS BY ELIG CLIN: HCPCS | Performed by: INTERNAL MEDICINE

## 2019-05-15 PROCEDURE — 99213 OFFICE O/P EST LOW 20 MIN: CPT | Performed by: INTERNAL MEDICINE

## 2019-05-15 PROCEDURE — 1036F TOBACCO NON-USER: CPT | Performed by: INTERNAL MEDICINE

## 2019-05-15 ASSESSMENT — ENCOUNTER SYMPTOMS
TROUBLE SWALLOWING: 0
COUGH: 1
WHEEZING: 0
SHORTNESS OF BREATH: 0
CHEST TIGHTNESS: 0
GASTROINTESTINAL NEGATIVE: 1

## 2019-07-30 RX ORDER — LEVETIRACETAM 500 MG/1
TABLET ORAL
Qty: 60 TABLET | Refills: 5 | Status: SHIPPED | OUTPATIENT
Start: 2019-07-30 | End: 2020-01-01

## 2019-07-31 RX ORDER — MOMETASONE FUROATE 1 MG/G
OINTMENT TOPICAL
Qty: 45 G | Refills: 2 | Status: SHIPPED | OUTPATIENT
Start: 2019-07-31 | End: 2019-01-01 | Stop reason: SDUPTHER

## 2019-09-03 RX ORDER — ESCITALOPRAM OXALATE 20 MG/1
TABLET ORAL
Qty: 30 TABLET | Refills: 3 | Status: SHIPPED | OUTPATIENT
Start: 2019-09-03 | End: 2020-01-01

## 2019-09-18 ENCOUNTER — OFFICE VISIT (OUTPATIENT)
Dept: INTERNAL MEDICINE CLINIC | Age: 84
End: 2019-09-18
Payer: MEDICARE

## 2019-09-18 VITALS — WEIGHT: 176 LBS | BODY MASS INDEX: 27.57 KG/M2

## 2019-09-18 DIAGNOSIS — I48.20 CHRONIC ATRIAL FIBRILLATION (HCC): Primary | ICD-10-CM

## 2019-09-18 DIAGNOSIS — I10 ESSENTIAL HYPERTENSION, BENIGN: ICD-10-CM

## 2019-09-18 DIAGNOSIS — I50.42 CHRONIC COMBINED SYSTOLIC AND DIASTOLIC CONGESTIVE HEART FAILURE (HCC): ICD-10-CM

## 2019-09-18 DIAGNOSIS — G40.909 SEIZURE DISORDER (HCC): ICD-10-CM

## 2019-09-18 LAB
ANION GAP SERPL CALCULATED.3IONS-SCNC: 13 MMOL/L (ref 3–16)
BUN BLDV-MCNC: 20 MG/DL (ref 7–20)
CALCIUM SERPL-MCNC: 8.9 MG/DL (ref 8.3–10.6)
CHLORIDE BLD-SCNC: 104 MMOL/L (ref 99–110)
CO2: 27 MMOL/L (ref 21–32)
CREAT SERPL-MCNC: 0.8 MG/DL (ref 0.8–1.3)
GFR AFRICAN AMERICAN: >60
GFR NON-AFRICAN AMERICAN: >60
GLUCOSE BLD-MCNC: 112 MG/DL (ref 70–99)
POTASSIUM SERPL-SCNC: 4.6 MMOL/L (ref 3.5–5.1)
SODIUM BLD-SCNC: 144 MMOL/L (ref 136–145)

## 2019-09-18 PROCEDURE — G0008 ADMIN INFLUENZA VIRUS VAC: HCPCS | Performed by: INTERNAL MEDICINE

## 2019-09-18 PROCEDURE — G8599 NO ASA/ANTIPLAT THER USE RNG: HCPCS | Performed by: INTERNAL MEDICINE

## 2019-09-18 PROCEDURE — G8419 CALC BMI OUT NRM PARAM NOF/U: HCPCS | Performed by: INTERNAL MEDICINE

## 2019-09-18 PROCEDURE — 36415 COLL VENOUS BLD VENIPUNCTURE: CPT | Performed by: INTERNAL MEDICINE

## 2019-09-18 PROCEDURE — 4040F PNEUMOC VAC/ADMIN/RCVD: CPT | Performed by: INTERNAL MEDICINE

## 2019-09-18 PROCEDURE — 90653 IIV ADJUVANT VACCINE IM: CPT | Performed by: INTERNAL MEDICINE

## 2019-09-18 PROCEDURE — 1036F TOBACCO NON-USER: CPT | Performed by: INTERNAL MEDICINE

## 2019-09-18 PROCEDURE — 1123F ACP DISCUSS/DSCN MKR DOCD: CPT | Performed by: INTERNAL MEDICINE

## 2019-09-18 PROCEDURE — G8428 CUR MEDS NOT DOCUMENT: HCPCS | Performed by: INTERNAL MEDICINE

## 2019-09-18 PROCEDURE — 99214 OFFICE O/P EST MOD 30 MIN: CPT | Performed by: INTERNAL MEDICINE

## 2019-09-18 ASSESSMENT — ENCOUNTER SYMPTOMS
CHEST TIGHTNESS: 0
GASTROINTESTINAL NEGATIVE: 1
COUGH: 1
WHEEZING: 0
TROUBLE SWALLOWING: 0
SHORTNESS OF BREATH: 0

## 2019-09-22 LAB
KEPPRA DOSE AMT: NORMAL
KEPPRA: 28.1 UG/ML (ref 6–46)

## 2019-10-08 RX ORDER — OMEPRAZOLE 20 MG/1
20 CAPSULE, DELAYED RELEASE ORAL DAILY
Qty: 90 CAPSULE | Refills: 1 | Status: SHIPPED | OUTPATIENT
Start: 2019-10-08 | End: 2020-01-01

## 2019-12-27 PROBLEM — L23.9 ALLERGIC DERMATITIS: Status: ACTIVE | Noted: 2019-01-01

## 2020-01-01 ENCOUNTER — HOSPITAL ENCOUNTER (OUTPATIENT)
Dept: GENERAL RADIOLOGY | Age: 85
Discharge: HOME | End: 2020-08-18
Payer: MEDICARE

## 2020-01-01 ENCOUNTER — HOSPITAL ENCOUNTER (OUTPATIENT)
Dept: PHYSICAL THERAPY | Age: 85
Setting detail: THERAPIES SERIES
Discharge: HOME | End: 2020-01-23
Payer: MEDICARE

## 2020-01-01 ENCOUNTER — TELEPHONE (OUTPATIENT)
Dept: INTERNAL MEDICINE CLINIC | Age: 85
End: 2020-01-01

## 2020-01-01 ENCOUNTER — HOSPITAL ENCOUNTER (OUTPATIENT)
Dept: PHYSICAL THERAPY | Age: 85
Setting detail: THERAPIES SERIES
Discharge: HOME | End: 2020-02-19
Payer: MEDICARE

## 2020-01-01 ENCOUNTER — HOSPITAL ENCOUNTER (OUTPATIENT)
Dept: SPEECH THERAPY | Age: 85
Setting detail: THERAPIES SERIES
Discharge: HOME | End: 2020-08-18
Payer: MEDICARE

## 2020-01-01 ENCOUNTER — HOSPITAL ENCOUNTER (OUTPATIENT)
Dept: PHYSICAL THERAPY | Age: 85
Setting detail: THERAPIES SERIES
Discharge: HOME | End: 2020-02-04
Payer: MEDICARE

## 2020-01-01 ENCOUNTER — OFFICE VISIT (OUTPATIENT)
Dept: INTERNAL MEDICINE CLINIC | Age: 85
End: 2020-01-01
Payer: MEDICARE

## 2020-01-01 ENCOUNTER — HOSPITAL ENCOUNTER (INPATIENT)
Age: 85
LOS: 7 days | Discharge: HOSPICE | DRG: 292 | End: 2020-09-18
Attending: EMERGENCY MEDICINE | Admitting: INTERNAL MEDICINE
Payer: MEDICARE

## 2020-01-01 ENCOUNTER — APPOINTMENT (OUTPATIENT)
Dept: GENERAL RADIOLOGY | Age: 85
DRG: 292 | End: 2020-01-01
Payer: MEDICARE

## 2020-01-01 ENCOUNTER — CARE COORDINATION (OUTPATIENT)
Dept: CASE MANAGEMENT | Age: 85
End: 2020-01-01

## 2020-01-01 ENCOUNTER — HOSPITAL ENCOUNTER (OUTPATIENT)
Dept: PHYSICAL THERAPY | Age: 85
Setting detail: THERAPIES SERIES
Discharge: HOME | End: 2020-02-11
Payer: MEDICARE

## 2020-01-01 ENCOUNTER — HOSPITAL ENCOUNTER (OUTPATIENT)
Age: 85
Discharge: HOME | End: 2020-01-08
Payer: MEDICARE

## 2020-01-01 ENCOUNTER — NURSE TRIAGE (OUTPATIENT)
Dept: OTHER | Facility: CLINIC | Age: 85
End: 2020-01-01

## 2020-01-01 ENCOUNTER — HOSPITAL ENCOUNTER (OUTPATIENT)
Dept: GENERAL RADIOLOGY | Age: 85
Discharge: HOME | End: 2020-01-08
Payer: MEDICARE

## 2020-01-01 ENCOUNTER — HOSPITAL ENCOUNTER (OUTPATIENT)
Dept: PHYSICAL THERAPY | Age: 85
Setting detail: THERAPIES SERIES
Discharge: HOME | End: 2020-01-15
Payer: MEDICARE

## 2020-01-01 ENCOUNTER — VIRTUAL VISIT (OUTPATIENT)
Dept: INTERNAL MEDICINE CLINIC | Age: 85
End: 2020-01-01
Payer: MEDICARE

## 2020-01-01 ENCOUNTER — TELEPHONE (OUTPATIENT)
Dept: ADMINISTRATIVE | Age: 85
End: 2020-01-01

## 2020-01-01 VITALS
RESPIRATION RATE: 16 BRPM | TEMPERATURE: 97 F | OXYGEN SATURATION: 95 % | BODY MASS INDEX: 27.78 KG/M2 | HEIGHT: 67 IN | DIASTOLIC BLOOD PRESSURE: 59 MMHG | SYSTOLIC BLOOD PRESSURE: 125 MMHG | WEIGHT: 177 LBS | HEART RATE: 60 BPM

## 2020-01-01 VITALS
TEMPERATURE: 97.5 F | SYSTOLIC BLOOD PRESSURE: 120 MMHG | WEIGHT: 178.8 LBS | HEART RATE: 62 BPM | RESPIRATION RATE: 18 BRPM | OXYGEN SATURATION: 94 % | DIASTOLIC BLOOD PRESSURE: 66 MMHG | BODY MASS INDEX: 28.06 KG/M2 | HEIGHT: 67 IN

## 2020-01-01 VITALS
HEART RATE: 97 BPM | WEIGHT: 179.45 LBS | TEMPERATURE: 97.4 F | HEIGHT: 67 IN | OXYGEN SATURATION: 94 % | DIASTOLIC BLOOD PRESSURE: 67 MMHG | BODY MASS INDEX: 28.17 KG/M2 | SYSTOLIC BLOOD PRESSURE: 105 MMHG | RESPIRATION RATE: 16 BRPM

## 2020-01-01 LAB
A/G RATIO: 1.3 (ref 1.1–2.2)
ALBUMIN SERPL-MCNC: 3.6 G/DL (ref 3.4–5)
ALP BLD-CCNC: 67 U/L (ref 40–129)
ALT SERPL-CCNC: 14 U/L (ref 10–40)
ANION GAP SERPL CALCULATED.3IONS-SCNC: 10 MMOL/L (ref 3–16)
ANION GAP SERPL CALCULATED.3IONS-SCNC: 10 MMOL/L (ref 3–16)
ANION GAP SERPL CALCULATED.3IONS-SCNC: 11 MMOL/L (ref 3–16)
ANION GAP SERPL CALCULATED.3IONS-SCNC: 13 MMOL/L (ref 3–16)
ANION GAP SERPL CALCULATED.3IONS-SCNC: 15 MMOL/L (ref 3–16)
ANION GAP SERPL CALCULATED.3IONS-SCNC: 9 MMOL/L (ref 3–16)
AST SERPL-CCNC: 28 U/L (ref 15–37)
BASOPHILS ABSOLUTE: 0.1 K/UL (ref 0–0.2)
BASOPHILS ABSOLUTE: 0.1 K/UL (ref 0–0.2)
BASOPHILS RELATIVE PERCENT: 0.8 %
BASOPHILS RELATIVE PERCENT: 1 %
BILIRUB SERPL-MCNC: 0.6 MG/DL (ref 0–1)
BUN BLDV-MCNC: 15 MG/DL (ref 7–20)
BUN BLDV-MCNC: 16 MG/DL (ref 7–20)
BUN BLDV-MCNC: 16 MG/DL (ref 7–20)
BUN BLDV-MCNC: 17 MG/DL (ref 7–20)
BUN BLDV-MCNC: 17 MG/DL (ref 7–20)
BUN BLDV-MCNC: 22 MG/DL (ref 7–20)
CALCIUM SERPL-MCNC: 8.4 MG/DL (ref 8.3–10.6)
CALCIUM SERPL-MCNC: 8.7 MG/DL (ref 8.3–10.6)
CALCIUM SERPL-MCNC: 8.8 MG/DL (ref 8.3–10.6)
CALCIUM SERPL-MCNC: 8.9 MG/DL (ref 8.3–10.6)
CALCIUM SERPL-MCNC: 9 MG/DL (ref 8.3–10.6)
CALCIUM SERPL-MCNC: 9.1 MG/DL (ref 8.3–10.6)
CHLORIDE BLD-SCNC: 101 MMOL/L (ref 99–110)
CHLORIDE BLD-SCNC: 102 MMOL/L (ref 99–110)
CHLORIDE BLD-SCNC: 103 MMOL/L (ref 99–110)
CHLORIDE BLD-SCNC: 104 MMOL/L (ref 99–110)
CHLORIDE BLD-SCNC: 104 MMOL/L (ref 99–110)
CHLORIDE BLD-SCNC: 91 MMOL/L (ref 99–110)
CHLORIDE BLD-SCNC: 95 MMOL/L (ref 99–110)
CHLORIDE BLD-SCNC: 98 MMOL/L (ref 99–110)
CO2: 23 MMOL/L (ref 21–32)
CO2: 24 MMOL/L (ref 21–32)
CO2: 26 MMOL/L (ref 21–32)
CO2: 28 MMOL/L (ref 21–32)
CO2: 28 MMOL/L (ref 21–32)
CO2: 29 MMOL/L (ref 21–32)
CO2: 31 MMOL/L (ref 21–32)
CO2: 33 MMOL/L (ref 21–32)
CREAT SERPL-MCNC: 0.8 MG/DL (ref 0.8–1.3)
CREAT SERPL-MCNC: 0.9 MG/DL (ref 0.8–1.3)
CREAT SERPL-MCNC: 1 MG/DL (ref 0.8–1.3)
DIGOXIN LEVEL: 1.3 NG/ML (ref 0.8–2)
EKG ATRIAL RATE: 101 BPM
EKG DIAGNOSIS: NORMAL
EKG Q-T INTERVAL: 386 MS
EKG QRS DURATION: 148 MS
EKG QTC CALCULATION (BAZETT): 515 MS
EKG R AXIS: -44 DEGREES
EKG T AXIS: 89 DEGREES
EKG VENTRICULAR RATE: 107 BPM
EOSINOPHILS ABSOLUTE: 0.1 K/UL (ref 0–0.6)
EOSINOPHILS ABSOLUTE: 0.2 K/UL (ref 0–0.6)
EOSINOPHILS RELATIVE PERCENT: 1.4 %
EOSINOPHILS RELATIVE PERCENT: 2 %
GFR AFRICAN AMERICAN: >60
GFR NON-AFRICAN AMERICAN: >60
GLOBULIN: 2.8 G/DL
GLUCOSE BLD-MCNC: 107 MG/DL (ref 70–99)
GLUCOSE BLD-MCNC: 111 MG/DL (ref 70–99)
GLUCOSE BLD-MCNC: 112 MG/DL (ref 70–99)
GLUCOSE BLD-MCNC: 114 MG/DL (ref 70–99)
GLUCOSE BLD-MCNC: 118 MG/DL (ref 70–99)
GLUCOSE BLD-MCNC: 130 MG/DL (ref 70–99)
GLUCOSE BLD-MCNC: 87 MG/DL (ref 70–99)
GLUCOSE BLD-MCNC: 96 MG/DL (ref 70–99)
HCT VFR BLD CALC: 37.2 % (ref 40.5–52.5)
HCT VFR BLD CALC: 42.1 % (ref 40.5–52.5)
HEMOGLOBIN: 12.4 G/DL (ref 13.5–17.5)
HEMOGLOBIN: 13.8 G/DL (ref 13.5–17.5)
KEPPRA DOSE AMT: NORMAL
KEPPRA: 26.9 UG/ML (ref 6–46)
LV EF: 18 %
LVEF MODALITY: NORMAL
LYMPHOCYTES ABSOLUTE: 1.2 K/UL (ref 1–5.1)
LYMPHOCYTES ABSOLUTE: 1.4 K/UL (ref 1–5.1)
LYMPHOCYTES RELATIVE PERCENT: 14 %
LYMPHOCYTES RELATIVE PERCENT: 15.3 %
MAGNESIUM: 2.1 MG/DL (ref 1.8–2.4)
MCH RBC QN AUTO: 31 PG (ref 26–34)
MCH RBC QN AUTO: 31.2 PG (ref 26–34)
MCHC RBC AUTO-ENTMCNC: 32.7 G/DL (ref 31–36)
MCHC RBC AUTO-ENTMCNC: 33.4 G/DL (ref 31–36)
MCV RBC AUTO: 93.2 FL (ref 80–100)
MCV RBC AUTO: 94.7 FL (ref 80–100)
MONOCYTES ABSOLUTE: 1.2 K/UL (ref 0–1.3)
MONOCYTES ABSOLUTE: 1.5 K/UL (ref 0–1.3)
MONOCYTES RELATIVE PERCENT: 15 %
MONOCYTES RELATIVE PERCENT: 16.3 %
NEUTROPHILS ABSOLUTE: 5.6 K/UL (ref 1.7–7.7)
NEUTROPHILS ABSOLUTE: 6.1 K/UL (ref 1.7–7.7)
NEUTROPHILS RELATIVE PERCENT: 66.2 %
NEUTROPHILS RELATIVE PERCENT: 68 %
PDW BLD-RTO: 14.3 % (ref 12.4–15.4)
PDW BLD-RTO: 14.6 % (ref 12.4–15.4)
PLATELET # BLD: 217 K/UL (ref 135–450)
PLATELET # BLD: 220 K/UL (ref 135–450)
PMV BLD AUTO: 7.6 FL (ref 5–10.5)
PMV BLD AUTO: 7.7 FL (ref 5–10.5)
POTASSIUM REFLEX MAGNESIUM: 3.4 MMOL/L (ref 3.5–5.1)
POTASSIUM REFLEX MAGNESIUM: 4.6 MMOL/L (ref 3.5–5.1)
POTASSIUM SERPL-SCNC: 3.3 MMOL/L (ref 3.5–5.1)
POTASSIUM SERPL-SCNC: 3.5 MMOL/L (ref 3.5–5.1)
POTASSIUM SERPL-SCNC: 3.6 MMOL/L (ref 3.5–5.1)
POTASSIUM SERPL-SCNC: 3.7 MMOL/L (ref 3.5–5.1)
POTASSIUM SERPL-SCNC: 3.8 MMOL/L (ref 3.5–5.1)
POTASSIUM SERPL-SCNC: 3.9 MMOL/L (ref 3.5–5.1)
PRO-BNP: 3637 PG/ML (ref 0–449)
PRO-BNP: 4216 PG/ML (ref 0–449)
PROCALCITONIN: 0.05 NG/ML (ref 0–0.15)
RBC # BLD: 3.99 M/UL (ref 4.2–5.9)
RBC # BLD: 4.45 M/UL (ref 4.2–5.9)
RBC # BLD: NORMAL 10*6/UL
SODIUM BLD-SCNC: 133 MMOL/L (ref 136–145)
SODIUM BLD-SCNC: 137 MMOL/L (ref 136–145)
SODIUM BLD-SCNC: 138 MMOL/L (ref 136–145)
SODIUM BLD-SCNC: 139 MMOL/L (ref 136–145)
SODIUM BLD-SCNC: 140 MMOL/L (ref 136–145)
SODIUM BLD-SCNC: 141 MMOL/L (ref 136–145)
T4 FREE: 1.9 NG/DL (ref 0.9–1.8)
TOTAL PROTEIN: 6.4 G/DL (ref 6.4–8.2)
TROPONIN: 0.01 NG/ML
TSH SERPL DL<=0.05 MIU/L-ACNC: 1.47 UIU/ML (ref 0.27–4.2)
WBC # BLD: 8.3 K/UL (ref 4–11)
WBC # BLD: 9.2 K/UL (ref 4–11)

## 2020-01-01 PROCEDURE — 6370000000 HC RX 637 (ALT 250 FOR IP): Performed by: INTERNAL MEDICINE

## 2020-01-01 PROCEDURE — 97035 APP MDLTY 1+ULTRASOUND EA 15: CPT

## 2020-01-01 PROCEDURE — 83880 ASSAY OF NATRIURETIC PEPTIDE: CPT

## 2020-01-01 PROCEDURE — 6360000002 HC RX W HCPCS: Performed by: INTERNAL MEDICINE

## 2020-01-01 PROCEDURE — 99233 SBSQ HOSP IP/OBS HIGH 50: CPT | Performed by: INTERNAL MEDICINE

## 2020-01-01 PROCEDURE — 99223 1ST HOSP IP/OBS HIGH 75: CPT | Performed by: INTERNAL MEDICINE

## 2020-01-01 PROCEDURE — 97110 THERAPEUTIC EXERCISES: CPT

## 2020-01-01 PROCEDURE — G8417 CALC BMI ABV UP PARAM F/U: HCPCS | Performed by: INTERNAL MEDICINE

## 2020-01-01 PROCEDURE — 99232 SBSQ HOSP IP/OBS MODERATE 35: CPT | Performed by: NURSE PRACTITIONER

## 2020-01-01 PROCEDURE — 97162 PT EVAL MOD COMPLEX 30 MIN: CPT

## 2020-01-01 PROCEDURE — 36415 COLL VENOUS BLD VENIPUNCTURE: CPT

## 2020-01-01 PROCEDURE — 2060000000 HC ICU INTERMEDIATE R&B

## 2020-01-01 PROCEDURE — 92611 MOTION FLUOROSCOPY/SWALLOW: CPT

## 2020-01-01 PROCEDURE — 97140 MANUAL THERAPY 1/> REGIONS: CPT

## 2020-01-01 PROCEDURE — 6360000002 HC RX W HCPCS: Performed by: EMERGENCY MEDICINE

## 2020-01-01 PROCEDURE — 96374 THER/PROPH/DIAG INJ IV PUSH: CPT

## 2020-01-01 PROCEDURE — 94760 N-INVAS EAR/PLS OXIMETRY 1: CPT

## 2020-01-01 PROCEDURE — 93005 ELECTROCARDIOGRAM TRACING: CPT | Performed by: EMERGENCY MEDICINE

## 2020-01-01 PROCEDURE — G8428 CUR MEDS NOT DOCUMENT: HCPCS | Performed by: INTERNAL MEDICINE

## 2020-01-01 PROCEDURE — 71046 X-RAY EXAM CHEST 2 VIEWS: CPT

## 2020-01-01 PROCEDURE — 83735 ASSAY OF MAGNESIUM: CPT

## 2020-01-01 PROCEDURE — 2580000003 HC RX 258: Performed by: INTERNAL MEDICINE

## 2020-01-01 PROCEDURE — 6370000000 HC RX 637 (ALT 250 FOR IP): Performed by: NURSE PRACTITIONER

## 2020-01-01 PROCEDURE — 99213 OFFICE O/P EST LOW 20 MIN: CPT | Performed by: INTERNAL MEDICINE

## 2020-01-01 PROCEDURE — 99214 OFFICE O/P EST MOD 30 MIN: CPT | Performed by: INTERNAL MEDICINE

## 2020-01-01 PROCEDURE — 99285 EMERGENCY DEPT VISIT HI MDM: CPT

## 2020-01-01 PROCEDURE — 84443 ASSAY THYROID STIM HORMONE: CPT

## 2020-01-01 PROCEDURE — 6360000004 HC RX CONTRAST MEDICATION: Performed by: INTERNAL MEDICINE

## 2020-01-01 PROCEDURE — 99239 HOSP IP/OBS DSCHRG MGMT >30: CPT | Performed by: INTERNAL MEDICINE

## 2020-01-01 PROCEDURE — 80048 BASIC METABOLIC PNL TOTAL CA: CPT

## 2020-01-01 PROCEDURE — 1036F TOBACCO NON-USER: CPT | Performed by: INTERNAL MEDICINE

## 2020-01-01 PROCEDURE — 1123F ACP DISCUSS/DSCN MKR DOCD: CPT | Performed by: INTERNAL MEDICINE

## 2020-01-01 PROCEDURE — 80053 COMPREHEN METABOLIC PANEL: CPT

## 2020-01-01 PROCEDURE — 97116 GAIT TRAINING THERAPY: CPT

## 2020-01-01 PROCEDURE — 93010 ELECTROCARDIOGRAM REPORT: CPT | Performed by: INTERNAL MEDICINE

## 2020-01-01 PROCEDURE — 4040F PNEUMOC VAC/ADMIN/RCVD: CPT | Performed by: INTERNAL MEDICINE

## 2020-01-01 PROCEDURE — 97530 THERAPEUTIC ACTIVITIES: CPT

## 2020-01-01 PROCEDURE — 99442 PR PHYS/QHP TELEPHONE EVALUATION 11-20 MIN: CPT | Performed by: INTERNAL MEDICINE

## 2020-01-01 PROCEDURE — 80177 DRUG SCRN QUAN LEVETIRACETAM: CPT

## 2020-01-01 PROCEDURE — 97166 OT EVAL MOD COMPLEX 45 MIN: CPT

## 2020-01-01 PROCEDURE — 72100 X-RAY EXAM L-S SPINE 2/3 VWS: CPT

## 2020-01-01 PROCEDURE — 80162 ASSAY OF DIGOXIN TOTAL: CPT

## 2020-01-01 PROCEDURE — 85025 COMPLETE CBC W/AUTO DIFF WBC: CPT

## 2020-01-01 PROCEDURE — 97535 SELF CARE MNGMENT TRAINING: CPT

## 2020-01-01 PROCEDURE — 74230 X-RAY XM SWLNG FUNCJ C+: CPT

## 2020-01-01 PROCEDURE — G8427 DOCREV CUR MEDS BY ELIG CLIN: HCPCS | Performed by: INTERNAL MEDICINE

## 2020-01-01 PROCEDURE — 84439 ASSAY OF FREE THYROXINE: CPT

## 2020-01-01 PROCEDURE — G8482 FLU IMMUNIZE ORDER/ADMIN: HCPCS | Performed by: INTERNAL MEDICINE

## 2020-01-01 PROCEDURE — 84145 PROCALCITONIN (PCT): CPT

## 2020-01-01 PROCEDURE — 94761 N-INVAS EAR/PLS OXIMETRY MLT: CPT

## 2020-01-01 PROCEDURE — C8929 TTE W OR WO FOL WCON,DOPPLER: HCPCS

## 2020-01-01 PROCEDURE — 84484 ASSAY OF TROPONIN QUANT: CPT

## 2020-01-01 RX ORDER — FUROSEMIDE 40 MG/1
40 TABLET ORAL 2 TIMES DAILY
COMMUNITY

## 2020-01-01 RX ORDER — M-VIT,TX,IRON,MINS/CALC/FOLIC 27MG-0.4MG
1 TABLET ORAL DAILY
Status: DISCONTINUED | OUTPATIENT
Start: 2020-01-01 | End: 2020-01-01 | Stop reason: HOSPADM

## 2020-01-01 RX ORDER — TRAMADOL HYDROCHLORIDE 50 MG/1
50 TABLET ORAL EVERY 8 HOURS PRN
Qty: 28 TABLET | Refills: 0 | Status: SHIPPED | OUTPATIENT
Start: 2020-01-01 | End: 2020-01-01

## 2020-01-01 RX ORDER — POLYETHYLENE GLYCOL 3350 17 G/17G
17 POWDER, FOR SOLUTION ORAL DAILY PRN
Status: DISCONTINUED | OUTPATIENT
Start: 2020-01-01 | End: 2020-01-01 | Stop reason: HOSPADM

## 2020-01-01 RX ORDER — CARVEDILOL 12.5 MG/1
12.5 TABLET ORAL 2 TIMES DAILY WITH MEALS
Status: DISCONTINUED | OUTPATIENT
Start: 2020-01-01 | End: 2020-01-01

## 2020-01-01 RX ORDER — ACETAMINOPHEN 325 MG/1
650 TABLET ORAL EVERY 6 HOURS PRN
Status: DISCONTINUED | OUTPATIENT
Start: 2020-01-01 | End: 2020-01-01 | Stop reason: HOSPADM

## 2020-01-01 RX ORDER — METOLAZONE 2.5 MG/1
2.5 TABLET ORAL DAILY
Qty: 60 TABLET | Refills: 2 | Status: SHIPPED | OUTPATIENT
Start: 2020-01-01

## 2020-01-01 RX ORDER — OMEPRAZOLE 20 MG/1
20 CAPSULE, DELAYED RELEASE ORAL DAILY
Qty: 90 CAPSULE | Refills: 1 | Status: SHIPPED | OUTPATIENT
Start: 2020-01-01

## 2020-01-01 RX ORDER — POLYETHYLENE GLYCOL 3350 17 G/17G
17 POWDER, FOR SOLUTION ORAL DAILY PRN
Qty: 527 G | Refills: 1 | COMMUNITY
Start: 2020-01-01 | End: 2020-10-18

## 2020-01-01 RX ORDER — FLUOCINONIDE 0.5 MG/G
OINTMENT TOPICAL
Qty: 30 G | Refills: 3 | Status: ON HOLD
Start: 2020-01-01 | End: 2020-01-01 | Stop reason: HOSPADM

## 2020-01-01 RX ORDER — ESCITALOPRAM OXALATE 10 MG/1
20 TABLET ORAL DAILY
Status: DISCONTINUED | OUTPATIENT
Start: 2020-01-01 | End: 2020-01-01 | Stop reason: HOSPADM

## 2020-01-01 RX ORDER — ESCITALOPRAM OXALATE 20 MG/1
TABLET ORAL
Qty: 30 TABLET | Refills: 2 | Status: SHIPPED | OUTPATIENT
Start: 2020-01-01 | End: 2020-01-01

## 2020-01-01 RX ORDER — POTASSIUM CHLORIDE 20 MEQ/1
20 TABLET, EXTENDED RELEASE ORAL 2 TIMES DAILY
Status: DISCONTINUED | OUTPATIENT
Start: 2020-01-01 | End: 2020-01-01 | Stop reason: HOSPADM

## 2020-01-01 RX ORDER — ESCITALOPRAM OXALATE 20 MG/1
TABLET ORAL
Qty: 30 TABLET | Refills: 3 | Status: SHIPPED | OUTPATIENT
Start: 2020-01-01 | End: 2020-01-01

## 2020-01-01 RX ORDER — FUROSEMIDE 10 MG/ML
40 INJECTION INTRAMUSCULAR; INTRAVENOUS ONCE
Status: COMPLETED | OUTPATIENT
Start: 2020-01-01 | End: 2020-01-01

## 2020-01-01 RX ORDER — DIGOXIN 0.25 MG/ML
250 INJECTION INTRAMUSCULAR; INTRAVENOUS ONCE
Status: COMPLETED | OUTPATIENT
Start: 2020-01-01 | End: 2020-01-01

## 2020-01-01 RX ORDER — CARVEDILOL 12.5 MG/1
6.25 TABLET ORAL 2 TIMES DAILY WITH MEALS
Qty: 1 TABLET | Refills: 0
Start: 2020-01-01

## 2020-01-01 RX ORDER — POTASSIUM CHLORIDE 20 MEQ/1
40 TABLET, EXTENDED RELEASE ORAL ONCE
Status: COMPLETED | OUTPATIENT
Start: 2020-01-01 | End: 2020-01-01

## 2020-01-01 RX ORDER — SPIRONOLACTONE 25 MG/1
25 TABLET ORAL DAILY
Qty: 30 TABLET | Refills: 3 | Status: SHIPPED | OUTPATIENT
Start: 2020-01-01

## 2020-01-01 RX ORDER — DIGOXIN 125 MCG
125 TABLET ORAL DAILY
Status: DISCONTINUED | OUTPATIENT
Start: 2020-01-01 | End: 2020-01-01 | Stop reason: HOSPADM

## 2020-01-01 RX ORDER — SPIRONOLACTONE 25 MG/1
25 TABLET ORAL DAILY
Status: DISCONTINUED | OUTPATIENT
Start: 2020-01-01 | End: 2020-01-01 | Stop reason: HOSPADM

## 2020-01-01 RX ORDER — SPIRONOLACTONE 25 MG/1
25 TABLET ORAL 2 TIMES DAILY
Status: DISCONTINUED | OUTPATIENT
Start: 2020-01-01 | End: 2020-01-01

## 2020-01-01 RX ORDER — POTASSIUM CHLORIDE 750 MG/1
20 CAPSULE, EXTENDED RELEASE ORAL 2 TIMES DAILY
COMMUNITY

## 2020-01-01 RX ORDER — CARVEDILOL 6.25 MG/1
6.25 TABLET ORAL 2 TIMES DAILY WITH MEALS
Status: DISCONTINUED | OUTPATIENT
Start: 2020-01-01 | End: 2020-01-01 | Stop reason: HOSPADM

## 2020-01-01 RX ORDER — CALCIUM CARBONATE 500(1250)
500 TABLET ORAL
Status: DISCONTINUED | OUTPATIENT
Start: 2020-01-01 | End: 2020-01-01 | Stop reason: HOSPADM

## 2020-01-01 RX ORDER — AMIODARONE HYDROCHLORIDE 200 MG/1
200 TABLET ORAL DAILY
Status: DISCONTINUED | OUTPATIENT
Start: 2020-01-01 | End: 2020-01-01 | Stop reason: HOSPADM

## 2020-01-01 RX ORDER — ESCITALOPRAM OXALATE 20 MG/1
TABLET ORAL
Qty: 30 TABLET | Refills: 2 | Status: SHIPPED | OUTPATIENT
Start: 2020-01-01

## 2020-01-01 RX ORDER — FUROSEMIDE 10 MG/ML
40 INJECTION INTRAMUSCULAR; INTRAVENOUS 2 TIMES DAILY
Status: DISCONTINUED | OUTPATIENT
Start: 2020-01-01 | End: 2020-01-01

## 2020-01-01 RX ORDER — FUROSEMIDE 40 MG/1
40 TABLET ORAL 2 TIMES DAILY
Status: DISCONTINUED | OUTPATIENT
Start: 2020-01-01 | End: 2020-01-01 | Stop reason: HOSPADM

## 2020-01-01 RX ORDER — PSEUDOEPHEDRINE HCL 30 MG
100 TABLET ORAL DAILY
COMMUNITY
Start: 2020-01-01

## 2020-01-01 RX ORDER — ACETAMINOPHEN 650 MG/1
650 SUPPOSITORY RECTAL EVERY 6 HOURS PRN
Status: DISCONTINUED | OUTPATIENT
Start: 2020-01-01 | End: 2020-01-01 | Stop reason: HOSPADM

## 2020-01-01 RX ORDER — PROMETHAZINE HYDROCHLORIDE 25 MG/1
12.5 TABLET ORAL EVERY 6 HOURS PRN
Status: DISCONTINUED | OUTPATIENT
Start: 2020-01-01 | End: 2020-01-01 | Stop reason: HOSPADM

## 2020-01-01 RX ORDER — METHYLPREDNISOLONE 4 MG/1
TABLET ORAL
Qty: 1 KIT | Refills: 0 | Status: SHIPPED | OUTPATIENT
Start: 2020-01-01 | End: 2020-01-01

## 2020-01-01 RX ORDER — DIGOXIN 125 MCG
125 TABLET ORAL EVERY OTHER DAY
Qty: 30 TABLET | Refills: 3 | Status: SHIPPED | OUTPATIENT
Start: 2020-01-01

## 2020-01-01 RX ORDER — LEVETIRACETAM 500 MG/1
500 TABLET ORAL 2 TIMES DAILY
Status: DISCONTINUED | OUTPATIENT
Start: 2020-01-01 | End: 2020-01-01 | Stop reason: HOSPADM

## 2020-01-01 RX ORDER — SODIUM CHLORIDE 0.9 % (FLUSH) 0.9 %
10 SYRINGE (ML) INJECTION PRN
Status: DISCONTINUED | OUTPATIENT
Start: 2020-01-01 | End: 2020-01-01 | Stop reason: HOSPADM

## 2020-01-01 RX ORDER — PANTOPRAZOLE SODIUM 40 MG/1
40 TABLET, DELAYED RELEASE ORAL
Status: DISCONTINUED | OUTPATIENT
Start: 2020-01-01 | End: 2020-01-01 | Stop reason: HOSPADM

## 2020-01-01 RX ORDER — METOLAZONE 5 MG/1
2.5 TABLET ORAL DAILY
Status: DISCONTINUED | OUTPATIENT
Start: 2020-01-01 | End: 2020-01-01 | Stop reason: HOSPADM

## 2020-01-01 RX ORDER — BACLOFEN 10 MG/1
10 TABLET ORAL 2 TIMES DAILY PRN
Qty: 15 TABLET | Refills: 1 | Status: SHIPPED | OUTPATIENT
Start: 2020-01-01 | End: 2020-01-01

## 2020-01-01 RX ORDER — LEVETIRACETAM 500 MG/1
TABLET ORAL
Qty: 60 TABLET | Refills: 3 | Status: SHIPPED | OUTPATIENT
Start: 2020-01-01 | End: 2020-01-01

## 2020-01-01 RX ORDER — ONDANSETRON 2 MG/ML
4 INJECTION INTRAMUSCULAR; INTRAVENOUS EVERY 6 HOURS PRN
Status: DISCONTINUED | OUTPATIENT
Start: 2020-01-01 | End: 2020-01-01 | Stop reason: HOSPADM

## 2020-01-01 RX ORDER — LEVETIRACETAM 500 MG/1
TABLET ORAL
Qty: 60 TABLET | Refills: 3 | Status: SHIPPED | OUTPATIENT
Start: 2020-01-01

## 2020-01-01 RX ORDER — DOCUSATE SODIUM 100 MG/1
100 CAPSULE, LIQUID FILLED ORAL DAILY
Status: DISCONTINUED | OUTPATIENT
Start: 2020-01-01 | End: 2020-01-01 | Stop reason: HOSPADM

## 2020-01-01 RX ORDER — SODIUM CHLORIDE 0.9 % (FLUSH) 0.9 %
10 SYRINGE (ML) INJECTION EVERY 12 HOURS SCHEDULED
Status: DISCONTINUED | OUTPATIENT
Start: 2020-01-01 | End: 2020-01-01 | Stop reason: HOSPADM

## 2020-01-01 RX ADMIN — POTASSIUM CHLORIDE 20 MEQ: 20 TABLET, EXTENDED RELEASE ORAL at 19:54

## 2020-01-01 RX ADMIN — MULTIPLE VITAMINS W/ MINERALS TAB 1 TABLET: TAB at 08:11

## 2020-01-01 RX ADMIN — CARVEDILOL 6.25 MG: 6.25 TABLET, FILM COATED ORAL at 08:11

## 2020-01-01 RX ADMIN — SODIUM CHLORIDE, PRESERVATIVE FREE 10 ML: 5 INJECTION INTRAVENOUS at 09:59

## 2020-01-01 RX ADMIN — CARVEDILOL 6.25 MG: 6.25 TABLET, FILM COATED ORAL at 17:36

## 2020-01-01 RX ADMIN — DIGOXIN 250 MCG: 0.25 INJECTION INTRAMUSCULAR; INTRAVENOUS at 11:22

## 2020-01-01 RX ADMIN — PANTOPRAZOLE SODIUM 40 MG: 40 TABLET, DELAYED RELEASE ORAL at 05:58

## 2020-01-01 RX ADMIN — PANTOPRAZOLE SODIUM 40 MG: 40 TABLET, DELAYED RELEASE ORAL at 08:49

## 2020-01-01 RX ADMIN — CARVEDILOL 6.25 MG: 6.25 TABLET, FILM COATED ORAL at 09:59

## 2020-01-01 RX ADMIN — POTASSIUM CHLORIDE 20 MEQ: 20 TABLET, EXTENDED RELEASE ORAL at 08:50

## 2020-01-01 RX ADMIN — Medication 500 MG: at 08:50

## 2020-01-01 RX ADMIN — SODIUM CHLORIDE, PRESERVATIVE FREE 10 ML: 5 INJECTION INTRAVENOUS at 20:59

## 2020-01-01 RX ADMIN — FUROSEMIDE 40 MG: 40 TABLET ORAL at 17:42

## 2020-01-01 RX ADMIN — FUROSEMIDE 40 MG: 10 INJECTION, SOLUTION INTRAMUSCULAR; INTRAVENOUS at 17:41

## 2020-01-01 RX ADMIN — Medication 500 MG: at 09:44

## 2020-01-01 RX ADMIN — SODIUM CHLORIDE, PRESERVATIVE FREE 10 ML: 5 INJECTION INTRAVENOUS at 08:50

## 2020-01-01 RX ADMIN — CARVEDILOL 6.25 MG: 6.25 TABLET, FILM COATED ORAL at 17:42

## 2020-01-01 RX ADMIN — AMIODARONE HYDROCHLORIDE 200 MG: 200 TABLET ORAL at 08:11

## 2020-01-01 RX ADMIN — SODIUM CHLORIDE, PRESERVATIVE FREE 10 ML: 5 INJECTION INTRAVENOUS at 19:50

## 2020-01-01 RX ADMIN — CARVEDILOL 6.25 MG: 6.25 TABLET, FILM COATED ORAL at 07:56

## 2020-01-01 RX ADMIN — LEVETIRACETAM 500 MG: 500 TABLET ORAL at 07:56

## 2020-01-01 RX ADMIN — POTASSIUM CHLORIDE 20 MEQ: 20 TABLET, EXTENDED RELEASE ORAL at 09:59

## 2020-01-01 RX ADMIN — LEVETIRACETAM 500 MG: 500 TABLET ORAL at 21:20

## 2020-01-01 RX ADMIN — FUROSEMIDE 40 MG: 10 INJECTION, SOLUTION INTRAMUSCULAR; INTRAVENOUS at 17:18

## 2020-01-01 RX ADMIN — FUROSEMIDE 40 MG: 10 INJECTION, SOLUTION INTRAMUSCULAR; INTRAVENOUS at 08:50

## 2020-01-01 RX ADMIN — MULTIPLE VITAMINS W/ MINERALS TAB 1 TABLET: TAB at 08:49

## 2020-01-01 RX ADMIN — MULTIPLE VITAMINS W/ MINERALS TAB 1 TABLET: TAB at 09:44

## 2020-01-01 RX ADMIN — SODIUM CHLORIDE, PRESERVATIVE FREE 10 ML: 5 INJECTION INTRAVENOUS at 21:50

## 2020-01-01 RX ADMIN — SPIRONOLACTONE 25 MG: 25 TABLET ORAL at 12:00

## 2020-01-01 RX ADMIN — POTASSIUM CHLORIDE 20 MEQ: 20 TABLET, EXTENDED RELEASE ORAL at 08:13

## 2020-01-01 RX ADMIN — AMIODARONE HYDROCHLORIDE 200 MG: 200 TABLET ORAL at 08:49

## 2020-01-01 RX ADMIN — FUROSEMIDE 40 MG: 10 INJECTION, SOLUTION INTRAMUSCULAR; INTRAVENOUS at 13:30

## 2020-01-01 RX ADMIN — LEVETIRACETAM 500 MG: 500 TABLET ORAL at 08:31

## 2020-01-01 RX ADMIN — AMIODARONE HYDROCHLORIDE 200 MG: 200 TABLET ORAL at 08:27

## 2020-01-01 RX ADMIN — DIGOXIN 125 MCG: 125 TABLET ORAL at 08:12

## 2020-01-01 RX ADMIN — ESCITALOPRAM OXALATE 20 MG: 10 TABLET ORAL at 08:29

## 2020-01-01 RX ADMIN — MULTIPLE VITAMINS W/ MINERALS TAB 1 TABLET: TAB at 09:59

## 2020-01-01 RX ADMIN — PERFLUTREN 1.65 MG: 6.52 INJECTION, SUSPENSION INTRAVENOUS at 09:32

## 2020-01-01 RX ADMIN — CARVEDILOL 6.25 MG: 6.25 TABLET, FILM COATED ORAL at 17:18

## 2020-01-01 RX ADMIN — POTASSIUM CHLORIDE 20 MEQ: 20 TABLET, EXTENDED RELEASE ORAL at 21:00

## 2020-01-01 RX ADMIN — LEVETIRACETAM 500 MG: 500 TABLET ORAL at 21:50

## 2020-01-01 RX ADMIN — METOLAZONE 2.5 MG: 5 TABLET ORAL at 09:56

## 2020-01-01 RX ADMIN — SPIRONOLACTONE 25 MG: 25 TABLET ORAL at 08:12

## 2020-01-01 RX ADMIN — LEVETIRACETAM 500 MG: 500 TABLET ORAL at 21:01

## 2020-01-01 RX ADMIN — ONDANSETRON 4 MG: 2 INJECTION INTRAMUSCULAR; INTRAVENOUS at 00:54

## 2020-01-01 RX ADMIN — POTASSIUM CHLORIDE 40 MEQ: 1500 TABLET, EXTENDED RELEASE ORAL at 08:40

## 2020-01-01 RX ADMIN — SODIUM CHLORIDE, PRESERVATIVE FREE 10 ML: 5 INJECTION INTRAVENOUS at 21:01

## 2020-01-01 RX ADMIN — LEVETIRACETAM 500 MG: 500 TABLET ORAL at 21:39

## 2020-01-01 RX ADMIN — AMIODARONE HYDROCHLORIDE 200 MG: 200 TABLET ORAL at 07:57

## 2020-01-01 RX ADMIN — MULTIPLE VITAMINS W/ MINERALS TAB 1 TABLET: TAB at 08:29

## 2020-01-01 RX ADMIN — PANTOPRAZOLE SODIUM 40 MG: 40 TABLET, DELAYED RELEASE ORAL at 06:04

## 2020-01-01 RX ADMIN — DOCUSATE SODIUM 100 MG: 100 CAPSULE, LIQUID FILLED ORAL at 09:59

## 2020-01-01 RX ADMIN — LEVETIRACETAM 500 MG: 500 TABLET ORAL at 08:49

## 2020-01-01 RX ADMIN — ENOXAPARIN SODIUM 30 MG: 30 INJECTION SUBCUTANEOUS at 21:50

## 2020-01-01 RX ADMIN — METOLAZONE 2.5 MG: 5 TABLET ORAL at 12:00

## 2020-01-01 RX ADMIN — POTASSIUM CHLORIDE 20 MEQ: 20 TABLET, EXTENDED RELEASE ORAL at 09:44

## 2020-01-01 RX ADMIN — LEVETIRACETAM 500 MG: 500 TABLET ORAL at 09:43

## 2020-01-01 RX ADMIN — ESCITALOPRAM OXALATE 20 MG: 10 TABLET ORAL at 08:27

## 2020-01-01 RX ADMIN — MULTIPLE VITAMINS W/ MINERALS TAB 1 TABLET: TAB at 07:56

## 2020-01-01 RX ADMIN — PANTOPRAZOLE SODIUM 40 MG: 40 TABLET, DELAYED RELEASE ORAL at 06:03

## 2020-01-01 RX ADMIN — FUROSEMIDE 40 MG: 10 INJECTION, SOLUTION INTRAMUSCULAR; INTRAVENOUS at 08:27

## 2020-01-01 RX ADMIN — POTASSIUM CHLORIDE 20 MEQ: 20 TABLET, EXTENDED RELEASE ORAL at 21:20

## 2020-01-01 RX ADMIN — SPIRONOLACTONE 25 MG: 25 TABLET ORAL at 09:58

## 2020-01-01 RX ADMIN — ENOXAPARIN SODIUM 30 MG: 30 INJECTION SUBCUTANEOUS at 20:59

## 2020-01-01 RX ADMIN — ESCITALOPRAM OXALATE 20 MG: 10 TABLET ORAL at 08:49

## 2020-01-01 RX ADMIN — LEVETIRACETAM 500 MG: 500 TABLET ORAL at 08:27

## 2020-01-01 RX ADMIN — POTASSIUM CHLORIDE 20 MEQ: 20 TABLET, EXTENDED RELEASE ORAL at 21:39

## 2020-01-01 RX ADMIN — DOCUSATE SODIUM 100 MG: 100 CAPSULE, LIQUID FILLED ORAL at 08:40

## 2020-01-01 RX ADMIN — POTASSIUM CHLORIDE 20 MEQ: 20 TABLET, EXTENDED RELEASE ORAL at 08:29

## 2020-01-01 RX ADMIN — CARVEDILOL 6.25 MG: 6.25 TABLET, FILM COATED ORAL at 17:07

## 2020-01-01 RX ADMIN — FUROSEMIDE 40 MG: 10 INJECTION, SOLUTION INTRAMUSCULAR; INTRAVENOUS at 07:56

## 2020-01-01 RX ADMIN — SODIUM CHLORIDE, PRESERVATIVE FREE 10 ML: 5 INJECTION INTRAVENOUS at 08:31

## 2020-01-01 RX ADMIN — POTASSIUM CHLORIDE 20 MEQ: 20 TABLET, EXTENDED RELEASE ORAL at 20:59

## 2020-01-01 RX ADMIN — DOCUSATE SODIUM 100 MG: 100 CAPSULE, LIQUID FILLED ORAL at 09:44

## 2020-01-01 RX ADMIN — CARVEDILOL 12.5 MG: 12.5 TABLET, FILM COATED ORAL at 18:01

## 2020-01-01 RX ADMIN — CARVEDILOL 6.25 MG: 6.25 TABLET, FILM COATED ORAL at 08:31

## 2020-01-01 RX ADMIN — Medication 500 MG: at 09:59

## 2020-01-01 RX ADMIN — AMIODARONE HYDROCHLORIDE 200 MG: 200 TABLET ORAL at 08:31

## 2020-01-01 RX ADMIN — ENOXAPARIN SODIUM 30 MG: 30 INJECTION SUBCUTANEOUS at 19:50

## 2020-01-01 RX ADMIN — MULTIPLE VITAMINS W/ MINERALS TAB 1 TABLET: TAB at 08:27

## 2020-01-01 RX ADMIN — CARVEDILOL 12.5 MG: 12.5 TABLET, FILM COATED ORAL at 08:50

## 2020-01-01 RX ADMIN — CARVEDILOL 6.25 MG: 6.25 TABLET, FILM COATED ORAL at 08:27

## 2020-01-01 RX ADMIN — ENOXAPARIN SODIUM 30 MG: 30 INJECTION SUBCUTANEOUS at 19:54

## 2020-01-01 RX ADMIN — ESCITALOPRAM OXALATE 20 MG: 10 TABLET ORAL at 09:43

## 2020-01-01 RX ADMIN — CARVEDILOL 6.25 MG: 6.25 TABLET, FILM COATED ORAL at 17:12

## 2020-01-01 RX ADMIN — Medication 500 MG: at 07:56

## 2020-01-01 RX ADMIN — DOCUSATE SODIUM 100 MG: 100 CAPSULE, LIQUID FILLED ORAL at 08:29

## 2020-01-01 RX ADMIN — DIGOXIN 125 MCG: 125 TABLET ORAL at 09:59

## 2020-01-01 RX ADMIN — ESCITALOPRAM OXALATE 20 MG: 10 TABLET ORAL at 18:01

## 2020-01-01 RX ADMIN — ACETAMINOPHEN 650 MG: 325 TABLET ORAL at 17:07

## 2020-01-01 RX ADMIN — ENOXAPARIN SODIUM 30 MG: 30 INJECTION SUBCUTANEOUS at 21:20

## 2020-01-01 RX ADMIN — POTASSIUM CHLORIDE 20 MEQ: 20 TABLET, EXTENDED RELEASE ORAL at 08:27

## 2020-01-01 RX ADMIN — ESCITALOPRAM OXALATE 20 MG: 10 TABLET ORAL at 08:11

## 2020-01-01 RX ADMIN — LEVETIRACETAM 500 MG: 500 TABLET ORAL at 19:50

## 2020-01-01 RX ADMIN — CARVEDILOL 6.25 MG: 6.25 TABLET, FILM COATED ORAL at 17:41

## 2020-01-01 RX ADMIN — DIGOXIN 125 MCG: 125 TABLET ORAL at 09:44

## 2020-01-01 RX ADMIN — PANTOPRAZOLE SODIUM 40 MG: 40 TABLET, DELAYED RELEASE ORAL at 06:18

## 2020-01-01 RX ADMIN — Medication 500 MG: at 08:27

## 2020-01-01 RX ADMIN — ENOXAPARIN SODIUM 30 MG: 30 INJECTION SUBCUTANEOUS at 21:39

## 2020-01-01 RX ADMIN — FUROSEMIDE 40 MG: 10 INJECTION, SOLUTION INTRAMUSCULAR; INTRAVENOUS at 09:59

## 2020-01-01 RX ADMIN — Medication 500 MG: at 08:29

## 2020-01-01 RX ADMIN — DIGOXIN 125 MCG: 125 TABLET ORAL at 07:56

## 2020-01-01 RX ADMIN — SPIRONOLACTONE 25 MG: 25 TABLET ORAL at 17:18

## 2020-01-01 RX ADMIN — ENOXAPARIN SODIUM 30 MG: 30 INJECTION SUBCUTANEOUS at 21:00

## 2020-01-01 RX ADMIN — SODIUM CHLORIDE, PRESERVATIVE FREE 10 ML: 5 INJECTION INTRAVENOUS at 21:20

## 2020-01-01 RX ADMIN — LEVETIRACETAM 500 MG: 500 TABLET ORAL at 19:54

## 2020-01-01 RX ADMIN — Medication 500 MG: at 08:11

## 2020-01-01 RX ADMIN — PANTOPRAZOLE SODIUM 40 MG: 40 TABLET, DELAYED RELEASE ORAL at 05:35

## 2020-01-01 RX ADMIN — AMIODARONE HYDROCHLORIDE 200 MG: 200 TABLET ORAL at 18:01

## 2020-01-01 RX ADMIN — METOLAZONE 2.5 MG: 5 TABLET ORAL at 17:40

## 2020-01-01 RX ADMIN — FUROSEMIDE 40 MG: 10 INJECTION, SOLUTION INTRAMUSCULAR; INTRAVENOUS at 08:31

## 2020-01-01 RX ADMIN — SPIRONOLACTONE 25 MG: 25 TABLET ORAL at 07:56

## 2020-01-01 RX ADMIN — AMIODARONE HYDROCHLORIDE 200 MG: 200 TABLET ORAL at 09:44

## 2020-01-01 RX ADMIN — FUROSEMIDE 40 MG: 10 INJECTION, SOLUTION INTRAMUSCULAR; INTRAVENOUS at 18:01

## 2020-01-01 RX ADMIN — ESCITALOPRAM OXALATE 20 MG: 10 TABLET ORAL at 07:56

## 2020-01-01 RX ADMIN — LEVETIRACETAM 500 MG: 500 TABLET ORAL at 08:11

## 2020-01-01 RX ADMIN — PANTOPRAZOLE SODIUM 40 MG: 40 TABLET, DELAYED RELEASE ORAL at 05:20

## 2020-01-01 RX ADMIN — POTASSIUM CHLORIDE 20 MEQ: 20 TABLET, EXTENDED RELEASE ORAL at 21:50

## 2020-01-01 RX ADMIN — LEVETIRACETAM 500 MG: 500 TABLET ORAL at 20:59

## 2020-01-01 RX ADMIN — POTASSIUM CHLORIDE 20 MEQ: 20 TABLET, EXTENDED RELEASE ORAL at 07:56

## 2020-01-01 RX ADMIN — POTASSIUM CHLORIDE 20 MEQ: 20 TABLET, EXTENDED RELEASE ORAL at 19:50

## 2020-01-01 RX ADMIN — AMIODARONE HYDROCHLORIDE 200 MG: 200 TABLET ORAL at 09:59

## 2020-01-01 RX ADMIN — SODIUM CHLORIDE, PRESERVATIVE FREE 10 ML: 5 INJECTION INTRAVENOUS at 08:28

## 2020-01-01 RX ADMIN — SODIUM CHLORIDE, PRESERVATIVE FREE 10 ML: 5 INJECTION INTRAVENOUS at 21:40

## 2020-01-01 RX ADMIN — SODIUM CHLORIDE, PRESERVATIVE FREE 10 ML: 5 INJECTION INTRAVENOUS at 09:48

## 2020-01-01 RX ADMIN — ESCITALOPRAM OXALATE 20 MG: 10 TABLET ORAL at 09:59

## 2020-01-01 RX ADMIN — DIGOXIN 125 MCG: 125 TABLET ORAL at 08:31

## 2020-01-01 RX ADMIN — SODIUM CHLORIDE, PRESERVATIVE FREE 10 ML: 5 INJECTION INTRAVENOUS at 19:54

## 2020-01-01 RX ADMIN — METOLAZONE 2.5 MG: 5 TABLET ORAL at 08:11

## 2020-01-01 RX ADMIN — DOCUSATE SODIUM 100 MG: 100 CAPSULE, LIQUID FILLED ORAL at 08:11

## 2020-01-01 RX ADMIN — FUROSEMIDE 40 MG: 10 INJECTION, SOLUTION INTRAMUSCULAR; INTRAVENOUS at 17:12

## 2020-01-01 RX ADMIN — FUROSEMIDE 40 MG: 40 TABLET ORAL at 09:44

## 2020-01-01 RX ADMIN — LEVETIRACETAM 500 MG: 500 TABLET ORAL at 09:59

## 2020-01-01 RX ADMIN — SODIUM CHLORIDE, PRESERVATIVE FREE 10 ML: 5 INJECTION INTRAVENOUS at 08:13

## 2020-01-01 RX ADMIN — FUROSEMIDE 40 MG: 40 TABLET ORAL at 08:11

## 2020-01-01 RX ADMIN — FUROSEMIDE 40 MG: 40 TABLET ORAL at 17:07

## 2020-01-01 SDOH — ECONOMIC STABILITY: INCOME INSECURITY: HOW HARD IS IT FOR YOU TO PAY FOR THE VERY BASICS LIKE FOOD, HOUSING, MEDICAL CARE, AND HEATING?: NOT HARD AT ALL

## 2020-01-01 ASSESSMENT — PAIN DESCRIPTION - ORIENTATION
ORIENTATION: RIGHT
ORIENTATION: RIGHT;LEFT

## 2020-01-01 ASSESSMENT — PATIENT HEALTH QUESTIONNAIRE - PHQ9
SUM OF ALL RESPONSES TO PHQ QUESTIONS 1-9: 0
1. LITTLE INTEREST OR PLEASURE IN DOING THINGS: 0
SUM OF ALL RESPONSES TO PHQ QUESTIONS 1-9: 0
SUM OF ALL RESPONSES TO PHQ9 QUESTIONS 1 & 2: 0
2. FEELING DOWN, DEPRESSED OR HOPELESS: 0

## 2020-01-01 ASSESSMENT — PAIN SCALES - QUEBEC BACK PAIN DISABILITY SCALE
QUEBEC CMS MODIFIER: CK
PULL OR PUSH HEAVY DOORS: 2
STAND UP FOR 20 TO 30 MINUTES: 0
WALK A FEW BLOCKS OR 300 TO 400M: 0
MAKE YOUR BED: 4
QUEBEC DISABILITY INDEX: 40-59%
SIT IN A CHAIR FOR SEVERAL HOURS: 1
GET OUT OF BED: 4
CARRY TWO BAGS OF GROCERIES: 1
CLIMB ONE FLIGHT OF STAIRS: 2
RIDE IN A CAR: 0
PUT ON SOCKS OR PANYHOSE: 0
MOVE A CHAIR: 2
WALK SEVERAL KILOMETERS  OR MILES: 5
RUN ONE BLOCK OR 100M: 5
TAKE FOOD OUT OF THE REFRIGERATOR: 0
TOTAL SCORE: 40
REACH UP TO HIGH SHELVES: 2
LIFT AND CARRY A HEAVY SUITCASE: 4
BEND OVER TO CLEAN THE BATHTUB: 5
TURN OVER IN BED: 0
THROW A BALL: 2
SLEEP THROUGH THE NIGHT: 1

## 2020-01-01 ASSESSMENT — ENCOUNTER SYMPTOMS
COUGH: 1
CHEST TIGHTNESS: 0
TROUBLE SWALLOWING: 0
SHORTNESS OF BREATH: 0
CHEST TIGHTNESS: 0
GASTROINTESTINAL NEGATIVE: 1
SHORTNESS OF BREATH: 1
GASTROINTESTINAL NEGATIVE: 1
COUGH: 0
WHEEZING: 0
CHEST TIGHTNESS: 0
GASTROINTESTINAL NEGATIVE: 1
COUGH: 1
TROUBLE SWALLOWING: 0
CHEST TIGHTNESS: 0
WHEEZING: 0
GASTROINTESTINAL NEGATIVE: 1
CHEST TIGHTNESS: 0
WHEEZING: 0
CHEST TIGHTNESS: 0
GASTROINTESTINAL NEGATIVE: 1
COUGH: 1
TROUBLE SWALLOWING: 0
SHORTNESS OF BREATH: 1
GASTROINTESTINAL NEGATIVE: 1
WHEEZING: 0
WHEEZING: 0
TROUBLE SWALLOWING: 0
TROUBLE SWALLOWING: 0
WHEEZING: 0
SHORTNESS OF BREATH: 0
CHEST TIGHTNESS: 0
TROUBLE SWALLOWING: 0
WHEEZING: 0
COUGH: 1
SHORTNESS OF BREATH: 1
COUGH: 1
SHORTNESS OF BREATH: 1
COUGH: 0
SHORTNESS OF BREATH: 1

## 2020-01-01 ASSESSMENT — PAIN DESCRIPTION - PAIN TYPE
TYPE: ACUTE PAIN
TYPE: ACUTE PAIN

## 2020-01-01 ASSESSMENT — PAIN SCALES - WONG BAKER
WONGBAKER_NUMERICALRESPONSE: 0

## 2020-01-01 ASSESSMENT — PAIN SCALES - GENERAL
PAINLEVEL_OUTOF10: 0
PAINLEVEL_OUTOF10: 9
PAINLEVEL_OUTOF10: 2
PAINLEVEL_OUTOF10: 5
PAINLEVEL_OUTOF10: 0

## 2020-01-01 ASSESSMENT — PAIN - FUNCTIONAL ASSESSMENT
PAIN_FUNCTIONAL_ASSESSMENT: PREVENTS OR INTERFERES SOME ACTIVE ACTIVITIES AND ADLS
PAIN_FUNCTIONAL_ASSESSMENT: PREVENTS OR INTERFERES SOME ACTIVE ACTIVITIES AND ADLS

## 2020-01-01 ASSESSMENT — PAIN DESCRIPTION - DESCRIPTORS
DESCRIPTORS: ACHING
DESCRIPTORS: BURNING

## 2020-01-01 ASSESSMENT — PAIN DESCRIPTION - LOCATION
LOCATION: BACK
LOCATION: ARM

## 2020-01-01 ASSESSMENT — PAIN DESCRIPTION - ONSET
ONSET: GRADUAL
ONSET: GRADUAL

## 2020-01-01 ASSESSMENT — PAIN DESCRIPTION - PROGRESSION
CLINICAL_PROGRESSION: NOT CHANGED
CLINICAL_PROGRESSION: NOT CHANGED

## 2020-01-01 ASSESSMENT — PAIN DESCRIPTION - FREQUENCY
FREQUENCY: CONTINUOUS
FREQUENCY: CONTINUOUS

## 2020-01-06 NOTE — TELEPHONE ENCOUNTER
I think this is for Estefanía Mendez ,her   We can not give pain meds with out seeing patient    He need to be seen

## 2020-01-08 PROBLEM — M54.50 ACUTE BILATERAL LOW BACK PAIN WITHOUT SCIATICA: Status: ACTIVE | Noted: 2020-01-01

## 2020-01-08 NOTE — PATIENT INSTRUCTIONS
Xray lumbar spine   Tramadol 50 mg 2-3 times daily as need for his back pain  Will arrange for therapy

## 2020-01-08 NOTE — PROGRESS NOTES
Subjective:      Patient ID: Babak Simon is a 80 y.o. male. HPIHaving back pain for 3-4 days and is in lower back both sides and is not radiating to extremities and denies any weakness in his legs  Has no urinary or gi symptoms  Increases  with activity and bending and sitting   Flexeril is not helping his symptoms  Review of Systems   Musculoskeletal: Positive for gait problem (due to pain and ha dno falls and walks with walker). Objective:   Physical Exam  Vitals signs and nursing note reviewed. Constitutional:       General: He is not in acute distress. Appearance: He is not ill-appearing. Musculoskeletal:      Right lower leg: Edema present. Left lower leg: Edema present. Comments: Has bilateral lower iliolumbar and si tenderness and has no spinal tenderness or paraspinal tenderness. Has no weakness of his lower extremities  Has slight difficulty getting up from chair and once he is up,his gait is normal  Has pain with lateral bends and flexion tho flexion is complete   Neurological:      Mental Status: He is alert.          Assessment:      Low back pain may be from arthritis or strain and has no signs or symptoms of radiculopathy      Plan:      Xray lumbar spine   Tramadol 50 mg 2-3 times daily as need for his back pain  Will arrange for therapy        Lakia Perez MD

## 2020-01-15 NOTE — FLOWSHEET NOTE
Physical Therapy Daily Treatment Note  Date:  1/15/2020    Patient Name:  Rosanna Reyes    :  1928  MRN: [id number]    Restrictions/Precautions: Restrictions/Precautions  Restrictions/Precautions: Fall Risk, Cardiac(Min falls risk)  Implants present? : Pacemaker, Metal implants (defibrillator)  , CAD  Pertinent Medical History: Additional Pertinent Hx: PLOF: Independent    Medical/Treatment Diagnosis Information:  ·  Diagnosis: Acute bilateral low back pain without sciatica  · Treatment Diagnosis: Cannot tolerate supine position, Left LE weakness, redness/breaking out on skin, paraspinal tightness on lumbar area. low back pain    Insurance/Certification information:  PT Insurance Information: Medicare  Physician Information:  Referring Practitioner: Dr. Herminio Covington of care signed (Y/N):  Sent to Dr. Isak Castle 1/15/20    Visit# / total visits:    Pain level:  5/10     G-Code (if applicable):      Date / Visit # G-Code Applied:  /   Maco: 40/CK    Progress Note: []  Yes  [x]  No  Next due by: Visit #10      History of Injury:  See below    Subjective:  Subjective  Subjective: Pt has a histroy of low back pain,and has had several falls . He was in a nursing home about 3 years ago. On , patient noted onset of back pain again and it is not getting any worse but not any better either. He has no pain in legs. It is right across low back. Objective: See eval   Observation:    Test measurements:        Exercises:  Exercise/Equipment Resistance/Repetitions Other comments   Seated forward flexion 5 sec x10    Seated glut sets 5 sec x 10                                                                   Other Therapeutic Activities:  Patient was educated on diagnosis, plan of care and prognosis of their complaint. Also, frequency and duration of treatments was discussed. Patient was informed of the attendance policy and issued a copy for their records.        Home Exercise Program: Patient was given

## 2020-01-15 NOTE — PLAN OF CARE
Outpatient Physical Therapy  [] [facility]    Phone: [phone]   Fax: [phone]   [x] [facility]  Phone: [phone]              Fax: [phone]  [] [facility]   Phone: [phone]   Fax: [phone]     To: Referring Practitioner: Dr. Esme Brasher      Patient: Yissel Nolan   : 1928   MRN: [id number]  Evaluation Date: 1/15/2020      Diagnosis Information:  ·   Acute bilateral low back pain without sciatica  · Treatment Diagnosis: Cannot tolerate supine position, Left LE weakness, redness/breaking out on skin, paraspinal tightness on lumbar area. low back pain     Physical Therapy Certification/Re-Certification Form  Dear Dr. Esme Brasher,  The following patient has been evaluated for physical therapy services and for therapy to continue, Medicare requires monthly physician review of the treatment plan. Please review the attached evaluation and/or summary of the patient's plan of care, and verify that you agree therapy should continue by signing the attached document and sending it back to our office. Plan of Care/Treatment to date:  [x] Therapeutic Exercise    [x] Modalities:  [x] Therapeutic Activity     [] Ultrasound  [] Electrical Stimulation  [] Gait Training      [] Cervical Traction [] Lumbar Traction  [] Neuromuscular Re-education    [] Cold/hotpack [] Iontophoresis   [x] Instruction in HEP     Other:  [x] Manual Therapy      []             [] Aquatic Therapy      []           ? Frequency/Duration:  # Days per week: [x] 1 day # Weeks: [] 1 week [] 5 weeks     [x] 2 days? [] 2 weeks [] 6 weeks     [] 3 days   [] 3 weeks [] 7 weeks     [] 4 days   [x] 4 weeks [] 8 weeks    Rehab Potential: [] Excellent [x] Good [] Fair  [] Poor       Electronically signed by:  Deshawn Appiah PT      If you have any questions or concerns, please don't hesitate to call.   Thank you for your referral.      Physician Signature:________________________________Date:__________________  By signing above, therapists plan is approved by physician

## 2020-01-23 NOTE — FLOWSHEET NOTE
and duration of treatments was discussed. Patient was informed of the attendance policy and issued a copy for their records. Home Exercise Program: Patient was given written instructions for home exercises as above. Patient performs them correctly and understands purpose. Manual Treatments:    Deep prep for STM to low back  X 23 min (PATIENT CANNOT tolerate supine position)     Modalities:    US x 12 min to low back while seated     Charges: Therapeutic Exercise:  [x] (62411) Provided verbal/tactile cueing for activities to restore or maintain strength, flexibility, endurance, ROM for improvements with self-care, mobility, lifting and ambulation. Neuromuscular Re-Education  [] (66562) Provided verbal/tactile cueing for activities to restore or maintain balance, coordination, kinesthetic sense, posture, motor skill, proprioception for self-care, mobility, lifting, and ambulation. Therapeutic Activities:    [] (89849) Provided verbal/tactile cueing to address functional limitations related to loss of mobility, strength, balance, and coordination.      Gait Training:  [] (35439) Provided training and instruction to the patient for proper postural muscle recruitment and positioning with ambulation re-education     Home Exercise Program:    [] (20325) Reviewed/Progressed HEP activities related to strengthening, flexibility, endurance, ROM for functional self-care, mobility, lifting and ambulation   [] (69906) Reviewed/Progressed HEP activities related to improving balance, coordination, kinesthetic sense, posture, motor skill, proprioception for self-care, mobility, lifting, and ambulation      Manual Treatments:  MFR / STM / Oscillations-Mobs:  G-I, II, III, IV / Manipulation / MLD  [x] (17089) Provided manual therapy to mobilize  soft tissue/joints/fluid for the purpose of modulating pain, promoting relaxation, increasing ROM, reducing/eliminating soft tissue swelling/inflammation/restriction, improving soft tissue extensibility and allowing for proper ROM for normal function with self- care, mobility, lifting and ambulation. Timed Code Treatment Minutes: 55   Total Treatment Minutes: 55     [] EVAL (LOW) 53143   [] EVAL (MOD) 33590   [] EVAL (HIGH) 56549   [] RE-EVAL   [x] TE (61666) x  1     [] NMR (95269)   x    [x] Manual (09888) x 23  [x] Ultrasound (15289) x12  [] TA (97659) x  [] Mech Traction (77405)  [] Ionto (36141)           [] ES (un) (11858):   [] Other:      Treatment/Activity Tolerance:  [x] Patient tolerated treatment well [] Patient limited by fatigue  [] Patient limited by pain  [] Patient limited by other medical complications  [] Other:     Prognosis: [x] Good [] Fair  [] Poor    Goals:    Short term goals  Time Frame for Short term goals: 5 weeks  Short term goal 1: Pt will note less back pain (2/10 or less) so he can sleep better   Short term goal 2: Pt will note increased Left LE strength to 4/5  Short term goal 3: Pt will note less tightness in lumbar paraspinals to  palpation  Short term goal 4: Pt will be independent in his HEP. Patient goals : \"Relief from pain\"      Patient Requires Follow-up: [x] Yes  [] No    Plan:   [x] Continue per plan of care [] Alter current plan (see comments)  [] Plan of care initiated [] Hold pending MD visit [] Discharge    Plan for Next Session: See above.   (Patient cannot come often due to wife's medical condition.)  Electronically signed by:  Chanel Farley, PT,

## 2020-02-04 NOTE — FLOWSHEET NOTE
Physical Therapy Daily Treatment Note  Date:  2020    Patient Name:  Andrea Vyas    :  1928  MRN: [id number]    Restrictions/Precautions: Restrictions/Precautions  Restrictions/Precautions: Fall Risk, Cardiac(Min falls risk)  Implants present? : Pacemaker, Metal implants (defibrillator)  , CAD  Pertinent Medical History: Additional Pertinent Hx: PLOF: Independent    Medical/Treatment Diagnosis Information:  ·  Diagnosis: Acute bilateral low back pain without sciatica  · Treatment Diagnosis: Cannot tolerate supine position, Left LE weakness, redness/breaking out on skin, paraspinal tightness on lumbar area. low back pain    Insurance/Certification information:  PT Insurance Information: Medicare  Physician Information:  Referring Practitioner: Dr. Jason Chacko of care signed (Y/N):  Sent to Dr. Leif Ronquillo 1/15/20    Visit# / total visits:  3/8  Pain level:  6/10     G-Code (if applicable):      Date / Visit # G-Code Applied:  /   Maco: 40/CK    Progress Note: []  Yes  [x]  No  Next due by: Visit #10      History of Injury:  See below    Subjective:  Subjective  Subjective: Pt has a histroy of low back pain,and has had several falls . He was in a nursing home about 3 years ago. On , patient noted onset of back pain again and it is not getting any worse but not any better either. He has no pain in legs. It is right across low back. 20: Pt reports that his back was sore after the last session. He feels better if he only does one session of exercises per day. 20: \"Legs feels stronger, but back hurts. \"    Objective: See eval   Observation:    Test measurements:        Exercises:  Exercise/Equipment Resistance/Repetitions Other comments   Seated forward flexion 5 sec x 10    Seated glut sets 5 sec x 10    Nu Step 6 min , WL 4    Leg press 65# x 40    Wobble board 2 min Added 2/4   SLS with one foot on step, alternating 5 sec x 10 Added 2/4 Other Therapeutic Activities:  Patient was educated on diagnosis, plan of care and prognosis of their complaint. Also, frequency and duration of treatments was discussed. Patient was informed of the attendance policy and issued a copy for their records. Home Exercise Program: Patient was given written instructions for home exercises as above. Patient performs them correctly and understands purpose. Manual Treatments:    Deep prep for STM to low back  X 11 min (PATIENT CANNOT tolerate supine position)     Modalities:    US x 14 min to low back while seated     Charges: Therapeutic Exercise:  [x] (34424) Provided verbal/tactile cueing for activities to restore or maintain strength, flexibility, endurance, ROM for improvements with self-care, mobility, lifting and ambulation. Neuromuscular Re-Education  [] (48921) Provided verbal/tactile cueing for activities to restore or maintain balance, coordination, kinesthetic sense, posture, motor skill, proprioception for self-care, mobility, lifting, and ambulation. Therapeutic Activities:    [] (08896) Provided verbal/tactile cueing to address functional limitations related to loss of mobility, strength, balance, and coordination.      Gait Training:  [] (22664) Provided training and instruction to the patient for proper postural muscle recruitment and positioning with ambulation re-education     Home Exercise Program:    [] (51918) Reviewed/Progressed HEP activities related to strengthening, flexibility, endurance, ROM for functional self-care, mobility, lifting and ambulation   [] (84455) Reviewed/Progressed HEP activities related to improving balance, coordination, kinesthetic sense, posture, motor skill, proprioception for self-care, mobility, lifting, and ambulation      Manual Treatments:  MFR / STM / Oscillations-Mobs:  G-I, II, III, IV / Manipulation / MLD  [x] (30038) Provided manual therapy to mobilize  soft tissue/joints/fluid for the purpose

## 2020-02-11 NOTE — FLOWSHEET NOTE
Added 2/4   LTR 3 min HEP                                         Other Therapeutic Activities:  Patient was educated on diagnosis, plan of care and prognosis of their complaint. Also, frequency and duration of treatments was discussed. Patient was informed of the attendance policy and issued a copy for their records. Home Exercise Program: Patient was given written instructions for home exercises as above. Patient performs them correctly and understands purpose. Manual Treatments:    Deep prep for STM to low back  X 11 min (PATIENT CANNOT tolerate supine position)     Modalities:    US x 14 min to low back while seated     Charges: Therapeutic Exercise:  [x] (16686) Provided verbal/tactile cueing for activities to restore or maintain strength, flexibility, endurance, ROM for improvements with self-care, mobility, lifting and ambulation. Neuromuscular Re-Education  [] (47401) Provided verbal/tactile cueing for activities to restore or maintain balance, coordination, kinesthetic sense, posture, motor skill, proprioception for self-care, mobility, lifting, and ambulation. Therapeutic Activities:    [] (48814) Provided verbal/tactile cueing to address functional limitations related to loss of mobility, strength, balance, and coordination.      Gait Training:  [] (75575) Provided training and instruction to the patient for proper postural muscle recruitment and positioning with ambulation re-education     Home Exercise Program:    [] (62912) Reviewed/Progressed HEP activities related to strengthening, flexibility, endurance, ROM for functional self-care, mobility, lifting and ambulation   [] (30185) Reviewed/Progressed HEP activities related to improving balance, coordination, kinesthetic sense, posture, motor skill, proprioception for self-care, mobility, lifting, and ambulation      Manual Treatments:  MFR / STM / Oscillations-Mobs:  G-I, II, III, IV / Manipulation / MLD  [x] (52006) Provided manual therapy to mobilize  soft tissue/joints/fluid for the purpose of modulating pain, promoting relaxation, increasing ROM, reducing/eliminating soft tissue swelling/inflammation/restriction, improving soft tissue extensibility and allowing for proper ROM for normal function with self- care, mobility, lifting and ambulation. Timed Code Treatment Minutes: 56   Total Treatment Minutes: 60     [] EVAL (LOW) 66050   [] EVAL (MOD) 83161   [] EVAL (HIGH) 91726   [] RE-EVAL   [x] TE (62175) x  2     [] NMR (55331)   x    [x] Manual (52501) x 1  [x] Ultrasound (11697) x14  [] TA (01002) x  [] Mech Traction (88207)  [] Ionto (25106)           [] ES (un) (74512):   [] Other:      Treatment/Activity Tolerance:  [x] Patient tolerated treatment well [] Patient limited by fatigue  [] Patient limited by pain  [] Patient limited by other medical complications  [] Other:     Prognosis: [x] Good [] Fair  [] Poor    Goals:    Short term goals  Time Frame for Short term goals: 5 weeks  Short term goal 1: Pt will note less back pain (2/10 or less) so he can sleep better   Short term goal 2: Pt will note increased Left LE strength to 4/5  Short term goal 3: Pt will note less tightness in lumbar paraspinals to  palpation  Short term goal 4: Pt will be independent in his HEP. Patient goals : \"Relief from pain\"      Patient Requires Follow-up: [x] Yes  [] No    Plan:   [x] Continue per plan of care [] Alter current plan (see comments)  [] Plan of care initiated [] Hold pending MD visit [] Discharge    Plan for Next Session: See above.   (Patient cannot come often due to wife's medical condition.)  Electronically signed by:  Amauri Mercado, PT,

## 2020-02-19 NOTE — FLOWSHEET NOTE
Physical Therapy Daily Treatment Note  Date:  2020    Patient Name:  Amber Hurst    :  1928  MRN: [id number]    Restrictions/Precautions: Restrictions/Precautions  Restrictions/Precautions: Fall Risk, Cardiac(Min falls risk)  Implants present? : Pacemaker, Metal implants (defibrillator)  , CAD  Pertinent Medical History: Additional Pertinent Hx: PLOF: Independent    Medical/Treatment Diagnosis Information:  ·  Diagnosis: Acute bilateral low back pain without sciatica  · Treatment Diagnosis: Cannot tolerate supine position, Left LE weakness, redness/breaking out on skin, paraspinal tightness on lumbar area. low back pain    Insurance/Certification information:  PT Insurance Information: Medicare  Physician Information:  Referring Practitioner: Dr. Benito Choi of care signed (Y/N):  Sent to Dr. Selena Gramajo 1/15/20    Visit# / total visits:    Pain level:  5/10     G-Code (if applicable):      Date / Visit # G-Code Applied:  /   Caty Dunlap: 40/CK    Progress Note: []  Yes  [x]  No  Next due by: Visit #10      History of Injury:  See below    Subjective:  Subjective  Subjective: Pt has a histroy of low back pain,and has had several falls . He was in a nursing home about 3 years ago. On , patient noted onset of back pain again and it is not getting any worse but not any better either. He has no pain in legs. It is right across low back. 20: Pt reports that his back was sore after the last session. He feels better if he only does one session of exercises per day. 20: \"Legs feels stronger, but back hurts. \"  20: Pt reports slightly more pain this week. 20: Pt reports that therapy is helping his back, although his wife said his back still hurts.     Objective: See eval   Observation:    Test measurements:        Exercises:  Exercise/Equipment Resistance/Repetitions Other comments   Seated forward flexion 5 sec x 10    Seated glut sets 5 sec x 10    Nu Step 6 min , WL 4    Leg press 70# x 40    Wobble board 2 min Added 2/4   SLS with one foot on step, alternating 5 sec x 10 Added 2/4   LTR 3 min HEP                                         Other Therapeutic Activities:  Patient was educated on diagnosis, plan of care and prognosis of their complaint. Also, frequency and duration of treatments was discussed. Patient was informed of the attendance policy and issued a copy for their records. Home Exercise Program: Patient was given written instructions for home exercises as above. Patient performs them correctly and understands purpose. Manual Treatments:    STM and stretching to low back  X 11 min (PATIENT CANNOT tolerate supine position)     Modalities:    US x 12 min continuous, 1.5 W/cm2 to low back while seated     Charges: Therapeutic Exercise:  [x] (64840) Provided verbal/tactile cueing for activities to restore or maintain strength, flexibility, endurance, ROM for improvements with self-care, mobility, lifting and ambulation. Neuromuscular Re-Education  [] (95497) Provided verbal/tactile cueing for activities to restore or maintain balance, coordination, kinesthetic sense, posture, motor skill, proprioception for self-care, mobility, lifting, and ambulation. Therapeutic Activities:    [] (55650) Provided verbal/tactile cueing to address functional limitations related to loss of mobility, strength, balance, and coordination.      Gait Training:  [] (25892) Provided training and instruction to the patient for proper postural muscle recruitment and positioning with ambulation re-education     Home Exercise Program:    [] (33066) Reviewed/Progressed HEP activities related to strengthening, flexibility, endurance, ROM for functional self-care, mobility, lifting and ambulation   [] (87241) Reviewed/Progressed HEP activities related to improving balance, coordination, kinesthetic sense, posture, motor skill, proprioception for self-care, mobility, lifting, and ambulation Manual Treatments:  MFR / STM / Oscillations-Mobs:  G-I, II, III, IV / Manipulation / MLD  [x] (02817) Provided manual therapy to mobilize  soft tissue/joints/fluid for the purpose of modulating pain, promoting relaxation, increasing ROM, reducing/eliminating soft tissue swelling/inflammation/restriction, improving soft tissue extensibility and allowing for proper ROM for normal function with self- care, mobility, lifting and ambulation. Timed Code Treatment Minutes: 56   Total Treatment Minutes: 60     [] EVAL (LOW) 43946   [] EVAL (MOD) 28844   [] EVAL (HIGH) 06731   [] RE-EVAL   [x] TE (59656) x  2     [] NMR (44141)   x    [x] Manual (09977) x 1  [x] Ultrasound (31590) x12  [] TA (89271) x  [] Mech Traction (18725)  [] Ionto (53543)           [] ES (un) (93769):   [] Other:      Treatment/Activity Tolerance:  [x] Patient tolerated treatment well [] Patient limited by fatigue  [] Patient limited by pain  [] Patient limited by other medical complications  [] Other:     Prognosis: [x] Good [] Fair  [] Poor    Goals:    Short term goals  Time Frame for Short term goals: 5 weeks  Short term goal 1: Pt will note less back pain (2/10 or less) so he can sleep better   Short term goal 2: Pt will note increased Left LE strength to 4/5  Short term goal 3: Pt will note less tightness in lumbar paraspinals to  palpation  Short term goal 4: Pt will be independent in his HEP. Patient goals : \"Relief from pain\"      Patient Requires Follow-up: [x] Yes  [] No    Plan:   [x] Continue per plan of care [] Alter current plan (see comments)  [] Plan of care initiated [] Hold pending MD visit [] Discharge    Plan for Next Session: See above.   (Patient cannot come often due to wife's medical condition.)  Electronically signed by:  Cliff Laughlin, PT,

## 2020-03-25 NOTE — PROGRESS NOTES
Physical Therapy      Outpatient Physical Therapy  [] [facility]    Phone: [phone]   Fax: [phone]   [x] [facility]  Phone: [phone]   Fax: [phone]  [] [facility]              Phone: [phone]   Fax: [phone]     Physical Therapy Progress/Discharge Note  Date: 3/25/2020        Patient Name:  Sandra Perez    :  1928  MRN: [id number]  Restrictions/Precautions:    · Diagnosis:  Acute bilateral low back pain without sciatica  · Treatment Diagnosis: Cannot tolerate supine position, Left LE weakness, redness/breaking out on skin, paraspinal tightness on lumbar area. low back pain     Insurance/Certification information:  PT Insurance Information: Medicare  Physician Information:  Referring Practitioner: Dr. Star Desai of care signed (Y/N):  Sent to Dr. Farah 1/15/20     Visit# / total visits:    Pain level:       5/10        Time Period for Report:  1/15/20-3/25/20  Cancels/No-shows to date:  0    Plan of Care/Treatment to date:  [x] Therapeutic Exercise    [x] Modalities:  [x] Therapeutic Activity     [] Ultrasound  [] Electrical Stimulation  [] Gait Training      [] Cervical Traction    [] Lumbar Traction  [] Neuromuscular Re-education  [] Cold/hotpack [] Iontophoresis  [x] Instruction in HEP      Other:  [x] Manual Therapy       []    [] Aquatic Therapy       []                          Significant Findings At Last Visit/Comments:    Subjective:      Pt has a histroy of low back pain,and has had several falls . He was in a nursing home about 3 years ago. On , patient noted onset of back pain again and it is not getting any worse but not any better either. He has no pain in legs. It is right across low back.     20: Pt reports that his back was sore after the last session. He feels better if he only does one session of exercises per day. 20: \"Legs feels stronger, but back hurts. \"  20: Pt reports slightly more pain this week.   20: Pt reports that therapy is helping his back, although his wife said his back still hurts.     Objective: See eval  · Observation:   Test measurements:         Assessment:   Summary: Pt attended 5 sessions, on an  Intermittent basis, due to his wife's medical issues. He responded well to each therapy session, but the relief was not long term. He was given a home exercise program and performed it well. Progression Towards Functional goals:  [] Patient is progressing as expected towards functional goals listed. [] Progression is slowed due to complexities listed. [x] Progression has been slowed due to co-morbidities. [] Plan just implemented, too soon to assess goals progression  [] Other:    Goals:  Short term goals  Time Frame for Short term goals: 5 weeks  Short term goal 1: Pt will note less back pain (2/10 or less) so he can sleep better  /NOT MET  Short term goal 2: Pt will note increased Left LE strength to 4/5/NOT RETESTED  Short term goal 3: Pt will note less tightness in lumbar paraspinals to  palpation/Partially MET  Short term goal 4: Pt will be independent in his HEP. /MET       Patient goals : \"Relief from pain\"/Partially MET       Rehab Potential: [] Excellent [x] Good [] Fair  [] Poor     Goal Status:  [] Achieved [x] Partially Achieved  [] Not Achieved     Current Frequency/Duration:  # Days per week: [x] 1 day # Weeks: [] 1 week [] 4 weeks      [] 2 days   [] 2 weeks [x] 5 weeks      [] 3 days   [] 3 weeks [] 6 weeks     Patient Status: [] Continue per initial plan of Care     [x] Patient now discharged     [] Additional visits requested, Please re-certify for additional visits:      Requested frequency/duration:  X/week for weeks    Electronically signed by:  Sonal Mitchell PT    If you have any questions or concerns, please don't hesitate to call.   Thank you for your referral.    Physician Signature:________________________________Date:__________________  By signing above, therapists plan is

## 2020-05-11 PROBLEM — M79.672 HEEL PAIN, CHRONIC, LEFT: Status: ACTIVE | Noted: 2020-01-01

## 2020-05-11 PROBLEM — G89.29 HEEL PAIN, CHRONIC, LEFT: Status: ACTIVE | Noted: 2020-01-01

## 2020-05-11 NOTE — PROGRESS NOTES
2 months when offices are open  I affirm this is a Patient Initiated Episode with a Patient who has not had a related appointment within my department in the past 7 days or scheduled within the next 24 hours.     Patient identification was verified at the start of the visit: Yes    Total Time: minutes: 11-20 [id number] minutes    Note: not billable if this call serves to triage the patient into an appointment for the relevant concern      Delta Levin

## 2020-07-07 NOTE — TELEPHONE ENCOUNTER
Ibuprofen 200 mg here they is ok but if he needs them all the time,he may have to see an Ortho MD,may be a cortisone injection will help.

## 2020-07-28 NOTE — TELEPHONE ENCOUNTER
LAST OV 5/11/2020  Future Appointments   Date Time Provider Endy Mahoney   7/29/2020  1:00 PM Mandi Hill [facility]

## 2020-07-29 PROBLEM — L97.921 ULCER OF LEFT LOWER EXTREMITY, LIMITED TO BREAKDOWN OF SKIN (HCC): Status: RESOLVED | Noted: 2018-09-26 | Resolved: 2020-01-01

## 2020-07-29 NOTE — PATIENT INSTRUCTIONS
Discussed with him and his wife  Will arrange for chest xary and modified barium swallow  Will try medrol dose pack for his knee pain and if still not better ,he need to contact his Ortho MD   Se julien in 3 months and Continue current medications

## 2020-07-29 NOTE — PROGRESS NOTES
Subjective:      Patient ID: Adelina Caldwell is a 80 y.o. male. HPI  Has pain left knee and seen ortho last week and was given injection and did not help yet and tylenol helped some but ibuprofen made him have diarrhea. Has no other cp gi or gu symptoms  Has chronic cough and seems more lately and seems more when he eats and drinks -water soup etc   Has to cough several times and brings up sputum and he feels less at nights and sputum is clear to yellow./  Denies orthopnea or pnd  Has no other cp gi or gu symptoms  Review of Systems   Constitutional: Negative for activity change, appetite change and unexpected weight change. HENT: Negative for trouble swallowing. Respiratory: Positive for cough. Negative for chest tightness, shortness of breath and wheezing. Cardiovascular: Positive for leg swelling. Negative for chest pain and palpitations. Gastrointestinal: Negative. Genitourinary: Negative. Neurological: Negative for dizziness, light-headedness and headaches. Objective:   Physical Exam  Vitals signs and nursing note reviewed. Constitutional:       General: He is not in acute distress. Eyes:      General: No scleral icterus. Extraocular Movements: Extraocular movements intact. Conjunctiva/sclera: Conjunctivae normal.      Pupils: Pupils are equal, round, and reactive to light. Neck:      Thyroid: No thyromegaly. Vascular: No carotid bruit. Cardiovascular:      Rate and Rhythm: Normal rate and regular rhythm. Heart sounds: Normal heart sounds. No murmur. No gallop. Pulmonary:      Effort: No respiratory distress. Breath sounds: Stridor: has decreased bs left base  No wheezing, rhonchi or rales. Musculoskeletal:      Right lower leg: Edema (1+ both sides) present. Left lower leg: Edema present. Lymphadenopathy:      Cervical: No cervical adenopathy. Neurological:      Mental Status: He is alert and oriented to person, place, and time.      left

## 2020-08-18 NOTE — TELEPHONE ENCOUNTER
Last office visit:07/29/2020    Future Appointments   Date Time Provider Endy Maru   8/18/2020 11:00 AM [facility] RM 1 NANCI [facility]   8/18/2020 11:00 AM LALA Horowitz SPEECH [facility]   11/2/2020 10:30 AM Umer LibertyJohny dutta

## 2020-08-18 NOTE — PROCEDURES
Distention: All  Reduced Laryngeal Elevation: All  Pooling Valleculae: Soft solid;Mechanical soft solid; Thin cup; Thin straw  Pooling Pyriform: Soft solid  Shallow Penetration During: Thin straw; Thin cup  Complete Self-clearing (shallow): Thin straw; Thin cup  Deep Penetration During: Thin straw(isolated x1 consecutive boluses)  Complete Retrieval (deep): Thin straw  Pharyngeal Residue - Valleculae: All  Pharyngeal Residue - Pyriform: Soft solid; Nectar cup; Thin cup; Thin straw  Pharyngeal Wall - Weakness:  All    Upper Esophageal Phase   No observed deficits      MINUTES/CHARGES  Time In: 1100  SLP Individual Minutes  Time In: 1100  Time Out: 1140  Minutes: 40  Coded treatment time  5     Electronically signed by   Nikita Nunn MS, CCC-SLP #[id number]  Speech Language Pathologist   8/18/2020 at 1203PM

## 2020-08-22 NOTE — TELEPHONE ENCOUNTER
Shen Trinh stated that she is returning a phone call to someone in the office in regards to the patients lab results.  Please call back

## 2020-09-11 PROBLEM — I50.23 ACUTE ON CHRONIC SYSTOLIC (CONGESTIVE) HEART FAILURE (HCC): Status: ACTIVE | Noted: 2020-01-01

## 2020-09-11 PROBLEM — I50.23 ACUTE ON CHRONIC SYSTOLIC HEART FAILURE (HCC): Status: ACTIVE | Noted: 2020-01-01

## 2020-09-11 NOTE — TELEPHONE ENCOUNTER
Reason for Disposition   Swollen scrotum OR lump in the scrotum/groin area   Scrotum swelling and no pain    Protocols used: PENIS AND SCROTUM SYMPTOMS-ADULT-OH, SCROTUM SWELLING-ADULT-OH    Received call from [facility]. Wife is reporting that patient woke up at 0300 to use the restroom and noticed his testicles are swollen. Denies any pain or redness. Wife also reports a decreased appetite and SOB for the past several weeks. Patient has spoke to Cardiology about his breathing. Provided care advice to help with symptoms and advised caller when he would need to seek emergency treatment. Call soft transferred to [address] to schedule appointment. Please do not reply to the triage nurse through this encounter. Any subsequent communication should be directly with the patient.

## 2020-09-11 NOTE — PROGRESS NOTES
Medication Reconciliation    List of medications patient is currently taking is complete. Source of information: 1. Medication list provided by wife                                      2. EPIC records      Allergies  Entex lq [phenylephrine-guaifenesin] and Relafen [nabumetone]     Notes regarding home medications:   1. Patient did not receive any of his home medications prior to arrival to the emergency department.

## 2020-09-11 NOTE — PROGRESS NOTES
Pt arrived to room 5265 oriented to room and call light. Pt to BR with disha. Wife at bedside. Pt very [place].,   Monitor placed on patient   Side rails up x2 call light with in reach.   bedalrm on

## 2020-09-11 NOTE — ED PROVIDER NOTES
throat. Respiratory: Productive cough, worsening shortness of breath, whitish sputum production. Cardiovascular: No chest pain. No palpitations. Gastrointestinal: No abdominal pain. No nausea or vomiting  Genitourinary: No dysuria. No hematuria. Hematology/Lymphatics: No bleeding or bruising tendency. Immunologic: No malaise. No swollen glands. Musculoskeletal: No back pain. No joint pain. Integumentary: No rash. No abrasions. Neurologic: No headache. No focal numbness or weakness. PAST MEDICAL HISTORY     Past Medical History:   Diagnosis Date    Atrial fibrillation ([address]     Atrial flutter ([address]     CAD (coronary artery disease)     s/p cabg - 4 vessel (per pt) 1986    Cerebral artery occlusion with cerebral infarction ([address]     CH[facility]congestive heart failure) ([facility])     CTS (carpal tunnel syndrome)     Left    Hyperlipidemia     Hypertension     ICD (implantable cardioverter-defibrillator) in place     2010, dual chamber medtronic Dr. Maurice Siemens, primary prevention    Ischemic cardiomyopathy 2010    EF 30% 2010, 50-55% 2015, 40-45% 2016         SURGICALHISTORY       Past Surgical History:   Procedure Laterality Date    CARDIAC DEFIBRILLATOR PLACEMENT Left 03/2010    Dual chamber Medtronic ICD placed by Dr. Isael Ruiz  9/13    colonic divericulosis,int hemorrhoids-DR. benitez    CORONARY ARTERY BYPASS GRAFT  1986    CORONARY ARTERY BYPASS GRAFT  1986    CRANIOTOMY Right 08/29/2016    RIGHT FRONTAL PARIETAL TREPHINE CRANIOTOMY, DRAIN PLACMENT         CURRENT MEDICATIONS       Previous Medications    AMIODARONE (CORDARONE) 200 MG TABLET    Take 200 mg by mouth daily.     BACLOFEN (LIORESAL) 10 MG TABLET    Take 1 tablet by mouth 2 times daily as needed (muscle spasms)    CALCIUM CARBONATE (OSCAL) 500 MG TABS TABLET    Take 500 mg by mouth daily    CARVEDILOL (COREG) 12.5 MG TABLET    Take 12.5 mg by mouth 2 times daily (with meals)    ESCITALOPRAM (LEXAPRO) 20 MG Partners: Male   Lifestyle    Physical activity     Days per week: None     Minutes per session: None    Stress: None   Relationships    Social connections     Talks on phone: None     Gets together: None     Attends Buddhist service: None     Active member of club or organization: None     Attends meetings of clubs or organizations: None     Relationship status: None    Intimate partner violence     Fear of current or ex partner: None     Emotionally abused: None     Physically abused: None     Forced sexual activity: None   Other Topics Concern    None   Social History Narrative    None       SCREENINGS    Battiest Coma Scale  Eye Opening: Spontaneous  Best Motor Response: Obeys commands        PHYSICAL EXAM    (up to 7 for level 4, 8 or more for level 5)     ED Triage Vitals [09/11/20 1146]   BP Temp Temp Source Pulse Resp SpO2 Height Weight   126/88 97.6 °F (36.4 °C) Oral 111 28 97 % -- 184 lb (83.5 kg)       General: Alert and oriented, No acute distress. Eye: Normal conjunctiva. Pupils equal and reactive. HENT: Oral mucosa is moist.  Respiratory: Diminished breath sounds bilaterally on lung auscultation, Respirations are non-labored. Cardiovascular: Mildly tachycardic, irregular rhythm  Gastrointestinal: Soft, Non-tender, Non-distended. Musculoskeletal: Pitting edema to the knee bilaterally, equal bilaterally, normal DP pulses. Erythema over the shins bilaterally, no significant tenderness. Scrotal edema. Integumentary: Warm, Dry. Neurologic: Alert, Oriented, No focal defects. Psychiatric: Cooperative.     DIAGNOSTIC RESULTS     EKG: All EKG's are interpreted by the Emergency Department Physician who either signs or Co-signsthis chart in the absence of a cardiologist.    Per my interpretation:    Electrocardiogram (ECG) 9/11/2020 1154  RATE: 107 bpm  RHYTHM: ATRIAL fibrillation  AXIS: normal  INTERVALS: normal  ST-T WAVE CHANGES: No evidence of ST segment elevation or T-wave inversion, left bundle branch block not changed from prior  Prior for comparison 9/1/2016    RADIOLOGY:   Non-plain filmimages such as CT, Ultrasound and MRI are read by the radiologist. [place] radiographic images are visualized and preliminarily interpreted by the emergency physician with the below findings:      Interpretation per the Radiologist below, if available at the time ofthis note:    XR CHEST (2 VW)   Preliminary Result   Interval appearance since August 2020 of basilar opacities with small   effusions. Findings may represent congestive heart failure or infection. Mild pulmonary edema.                ED BEDSIDE ULTRASOUND:   Performed by ED Physician - none    LABS:  Labs Reviewed   CBC WITH AUTO DIFFERENTIAL - Abnormal; Notable for the following components:       Result Value    Monocytes Absolute 1.5 (*)     All other components within normal limits    Narrative:     Performed at:  [address] [place]   Phone [phone]   COMPREHENSIVE METABOLIC PANEL W/ REFLEX TO MG FOR LOW K - Abnormal; Notable for the following components:    Glucose 130 (*)     BUN 22 (*)     All other components within normal limits    Narrative:     Performed at:  [facility]  [address] [facility] [employer] [address]   Phone [phone]   BRAIN NATRIURETIC PEPTIDE - Abnormal; Notable for the following components:    Pro-BNP 3,637 (*)     All other components within normal limits    Narrative:     Performed at:  [facility]  [address] [employer] [address]   Phone [phone]   TROPONIN    Narrative:     Performed at:  [facility]  [address] [employer] [address]   Phone [phone]   PROCALCITONIN    Narrative:     Performed at:  [facility]  [address] [place]   Phone [phone] All other labs were within normal range or not returned as of this dictation. EMERGENCY DEPARTMENT COURSE and DIFFERENTIAL DIAGNOSIS/MDM:   Vitals:    Vitals:    09/11/20 1316 09/11/20 1317 09/11/20 1324 09/11/20 1332   BP:  112/82  124/86   Pulse: 118 118 118 118   Resp: 20 20 21 18   Temp:       TempSrc:       SpO2: 95% 95% 95% 93%   Weight:             Medical decision making: This is a 66-year-old male with history of atrial fibrillation, CHF who presents for evaluation of worsening shortness of breath, peripheral and scrotal edema worsening for the past week or so. On exam, he is afebrile, overall well-appearing. He is tachycardic, with rates primarily 100s to 110s. EKG obtained and confirms atrial fibrillation, no evidence of acute ischemia. Rates remained in the 100s 110s, therefore additional rate control was not administered. Patient is noted to have significant peripheral edema on exam, diminished breath sounds at bilateral bases on lung auscultation. Presentation seems concerning for heart failure exacerbation, also considered ACS, pneumonia, bronchitis, COPD. CBC is within normal limits. CMP with mildly elevated BUN, normal electrolytes. Troponin is negative, procalcitonin is normal.  BNP is elevated at 3600. Chest x-ray shows bibasilar opacities and effusions as well as mild pulmonary edema, consistent with CHF exacerbation. Patient will be treated with 40 mg of IV Lasix, and admitted for further diuresis. Patient is wife are updated and agreeable plan of care. Accepted by his PCP, Dr. lEias Marin. CRITICAL CARE TIME   Total Critical Care time was 0 minutes, excluding separately reportable procedures. There was a high probability of clinically significant/life threatening deterioration in the patient's condition which required my urgent intervention.       CONSULTS:  IP CONSULT TO PRIMARY CARE PROVIDER    PROCEDURES:  Unless otherwise noted below, none         FINAL IMPRESSION 1. Longstanding persistent atrial fibrillation    2. Acute on chronic congestive heart failure, unspecified heart failure type [facility])          DISPOSITION/PLAN   DISPOSITION Decision To Admit 09/11/2020 01:18:51 PM      PATIENT REFERRED TO:  No follow-up provider specified.     DISCHARGE MEDICATIONS:  New Prescriptions    No medications on file          (Please note that portions of this note were completed with a voice recognition program.Efforts were made to edit the dictations but occasionally words are mis-transcribed.)    Rajiv Bryant MD (electronically signed)  Attending Emergency Physician        Rajiv Bryant MD  09/11/20 1282

## 2020-09-11 NOTE — ED TRIAGE NOTES
Pt reports sob and pain in testicles, Noted redness to pts rt leg with left leg edema and testicular edema

## 2020-09-11 NOTE — TELEPHONE ENCOUNTER
Per Cira, pt's 9.11.20 same day OV cx'd. Pt has a hx of CHF and currently has swollen testicles, SOB and is not eating. [employer] feels pt should be evaluated in ER due to current sx possibly being related to CHF. Spoke with pt's wife, explained Cira's recommendation that pt be evaluated in the ER. Wife stated, \"well, he won't go to the emergency room. In fact, he's sleeping right now. \" Reinforced suggestion that Derek Vences be evaluated in ER due to his current symptoms and suggested that perhaps she call the squad to take him. Wife stated, \"well, I mean, I have to go with him and I just can't go right now, i'm in the middle of a bunch of things. \". Again reiterated importance of pt being evaluated in ER due to his symptoms and hx of CHF. Wife stated, \"well, ok, i'll see if I can get him to go. \"  [employer] informed of conversation.

## 2020-09-11 NOTE — PROGRESS NOTES
4 Eyes Skin Assessment     The patient is being assess for  Transfer to New Unit    I agree that 2 RN's have performed a thorough Head to Toe Skin Assessment on the patient. ALL assessment sites listed below have been assessed. Areas assessed by both nurses:   [x]   Head, Face, and Ears   [x]   Shoulders, Back, and Chest  [x]   Arms, Elbows, and Hands   [x]   Coccyx, Sacrum, and IschIum  [x]   Legs, Feet, and Heels        Does the Patient have Skin Breakdown?   No         Nish Prevention initiated:  Yes   Wound Care Orders initiated:  NA      [facility] nurse consulted for Pressure Injury (Stage 3,4, Unstageable, DTI, NWPT, and Complex wounds), New and Established Ostomies:  No      Nurse 1 eSignature: Electronically signed by Edmar Brown RN on 9/11/20 at 7:22 PM EDT    **SHARE this note so that the co-signing nurse is able to place an eSignature**    Nurse 2 eSignature: Electronically signed by Mee Lew RN on 9/11/20 at 7:26 PM EDT

## 2020-09-12 PROBLEM — E87.6 HYPOKALEMIA: Status: ACTIVE | Noted: 2020-01-01

## 2020-09-12 NOTE — H&P
[address] [address]                              HISTORY AND PHYSICAL    PATIENT NAME: Sina Desir                   :        [id number]  MED REC NO:   [id number]                          ROOM:       [id number]  ACCOUNT NO:   [de-identified]                           ADMIT DATE: 2020  PROVIDER:     Mary Barillas MD    REASON FOR ADMISSION:  Congestive heart failure. HISTORY OF PRESENT ILLNESS:  This is a 63-year-old white male with a  history of an ischemic cardiomyopathy with ejection fractions in the  past that were reduced; his EF was 30% in , 50% to 55% in , and  40% to 45% in 2016; and who additionally has atrial fibrillation and  atrial flutter and is status post a cardiac defibrillator placement in  , who presented to the emergency room yesterday with his wife  reporting the history that he had been have worsening shortness of  breath and particularly with exertion for about the past week. He had  complained at home of shortness of breath but no chest pain. He had had  lower extremity and scrotal edema as well as some weeping of his lower  extremities as well. This morning, the patient is just back from  echocardiogram and states he is cold and is unable to give any  additional history. He states he is hungry and he is tired but cannot  give a history as to why he is in the hospital.    REVIEW OF SYSTEMS:  Limited based on the patient's inability to give a  history. PAST MEDICAL HISTORY:  Significant for coronary artery disease and  ischemic cardiomyopathy, ICD placement in the past, hypertension,  hyperlipidemia. He has a history of stroke in the past and atrial  fibrillation/flutter. PAST SURGICAL HISTORY:  Significant for four-vessel CABG in ,  cardiac defibrillator placement in , and he had a craniotomy in   with a drain placement.     ALLERGIES:  The patient is at  this point although the patient is not fully cooperative with the exam.    DIAGNOSTIC STUDIES:  White blood cell count 9.2, hemoglobin 13.8, and  platelet count 855 at presentation. EKG shows AFib with an RVR and a left bundle branch block. BNP was 3637. Troponin 0.01. Comprehensive metabolic panel showed a  sodium 139, potassium 4.6, chloride 101, CO2 of 23, glucose 130, BUN 22,  creatinine 1.0. Hepatic profile unremarkable. Procalcitonin was 0.05. Chest x-ray performed yesterday showed interval appearance of bibasilar  opacities with small effusions representing heart failure and mild  pulmonary edema. Keppra level was 26 which is therapeutic. Magnesium 2.1. Basic  metabolic panel this morning showed a potassium of 3.4, a creatinine of  0.9 and a BUN of 17. Magnesium was 2.1 today. CBC was fairly  unremarkable today. Echocardiogram was done this morning. It showed an ejection fraction of  15% to 20% which is dramatically reduced from previous. He has severe  diffuse hypokinesis and apical akinesis. Indeterminate diastolic  function. ASSESSMENT:  1. Acute-on-chronic systolic heart failure with new profound reduction  in ejection fraction. 2.  Atrial fibrillation. 3.  Hypertension. 4.  Hyperlipidemia. 5.  Coronary artery disease. 6.  GERD. 7.  Presence of an ICD. 8.  Seizure disorder. 9.  Hypokalemia. PLAN:  At this point, the potassium will be replaced per protocol. He  will continue on diuresis and we will monitor his renal function daily. We will recheck his magnesium level in the morning as well. Cardiology  has been consulted and we will defer to them for management of his  tachycardia given his profound reduction in his ejection fraction. Home  medicines will be continued. He will likely need PT, OT at some point  as well.         Maria Luz Cornejo MD    D: 09/12/2020 11:25:31       T: 09/12/2020 11:37:21     [id number]  Job#: [id number]     Doc#:

## 2020-09-12 NOTE — PLAN OF CARE
Problem: Falls - Risk of:  Goal: Will remain free from falls  Description: Will remain free from falls  9/12/2020 1040 by Tari Reza RN  Outcome: Ongoing  9/12/2020 0021 by Cheryl Schultz RN  Outcome: Ongoing   Fall risk assessment completed every shift. All precautions in place. Pt has call light within reach at all times. Room clear of clutter. Pt aware to call for assistance when getting up. Bed locked in lowest position, alarm engaged, call light within reach, will continue to monitor. Problem: Skin Integrity:  Goal: Will show no infection signs and symptoms  Description: Will show no infection signs and symptoms  9/12/2020 1040 by Tari Reza RN  Outcome: Ongoing  9/12/2020 0021 by Cheryl Schultz RN  Outcome: Ongoing   Skin assessment completed every shift. Pt assessed for incontinence, appropriate barrier cream applied prn. Pt encouraged to turn/rotate every 2 hours. Assistance provided if pt unable to do so themselves. Problem: OXYGENATION/RESPIRATORY FUNCTION  Goal: Patient will maintain patent airway  9/12/2020 1040 by Tari Reza RN  Outcome: Ongoing  9/12/2020 0021 by Cheryl Schultz RN  Outcome: Ongoing     Problem: FLUID AND ELECTROLYTE IMBALANCE  Goal: Fluid and electrolyte balance are achieved/maintained  9/12/2020 1040 by Tari Reza RN  Outcome: Ongoing  9/12/2020 0021 by Cheryl Schultz RN  Outcome: Ongoing   Pt electrolytes drawn and assessed per MD order. Replaced as needed per orders. I&O charted and assessed. Pt tolerating adequate fluid intake    Problem: HEMODYNAMIC STATUS  Goal: Patient has stable vital signs and fluid balance  9/12/2020 1040 by Tari Reza RN  Outcome: Ongoing  9/12/2020 0021 by Cheryl Schultz RN  Outcome: Ongoing   Vitals completed q4h and assessed by RN. I&O charted and assessed per MD order. Pt tolerating adequate fluid intake.     Problem: ACTIVITY INTOLERANCE/IMPAIRED MOBILITY  Goal: Mobility/activity is maintained at optimum level for patient  9/12/2020 1040 by Antonette Jones RN  Outcome: Ongoing  9/12/2020 0021 by Torito Payan RN  Outcome: Ongoing   Assess pts mobility status and compare to baseline. Determine if pt uses crutches/cane/walker and make sure they are within reach. Provide bedside commode to conserve pt energy. Encourage adeqaute rest periods, especially before meals, other activities of daily living, exercise sessions, and ambulation. Keep items pt uses frequently within reach.

## 2020-09-12 NOTE — CONSULTS
[address]  Cardiology Consult    Aby Jimenez  6/17/1928 September 12, 2020      Reason for Referral: Relation heart failure    CC: Shortness of breath      Subjective:     History of Present Illness:    Aby Jimenez is a 80 y.o. patient with a PMH significant for fibrillation, coronary disease, congestive heart failure, stroke presented with complaints of increasing shortness of breath. Patient complains of shortness of breath at rest, while supine only, while getting dressed, after one flight stairs. Symptoms include dyspnea on exertion and dyspnea when laying down. Symptoms began 1 week ago, gradually worsening since that time. Patient denies cough after eating and No chest pain. Also complains of lower extremity edema. . Associated symptoms include dyspnea and Increasing edema. Patient has not had recent travel. Weight has been stable. Appetite has been unaffected. Symptoms are exacerbated by any exercise. Symptoms are alleviated by rest.         Past Medical History:   has a past medical history of Atrial fibrillation ([address] Atrial flutter ([address] CAD (coronary artery disease), Cerebral artery occlusion with cerebral infarction ([address] CHF (congestive heart failure) ([address] CTS (carpal tunnel syndrome), Hyperlipidemia, Hypertension, ICD (implantable cardioverter-defibrillator) in place, and Ischemic cardiomyopathy. Surgical History:   has a past surgical history that includes Colonoscopy (2006); Colonoscopy (9/13); craniotomy (Right, 08/29/2016); Coronary artery bypass graft (1986); and Cardiac defibrillator placement (Left, 03/2010). Social History:   reports that he quit smoking about 37 years ago. He has never used smokeless tobacco. He reports that he does not drink alcohol or use drugs. Family History:  family history includes Cancer in his father; Diabetes in his brother and daughter; Heart Disease in his mother; Other in his son.     Home Medications:  Were reviewed and are listed in nursing record and/or below  Prior to Admission medications    Medication Sig Start Date End Date Taking? Authorizing Provider   furosemide (LASIX) 40 MG tablet Take 40 mg by mouth 2 times daily   Yes Historical Provider, MD   potassium chloride (MICRO-K) 10 MEQ extended release capsule Take 20 mEq by mouth 2 times daily   Yes Historical Provider, MD   fluocinonide (LIDEX) 0.05 % ointment APPLY ONE APPLICATION TO RASH ON WRISTS/LOWER FOREARMS DAILY. 8/18/20  Yes Rich Rosa MD   escitalopram (LEXAPRO) 20 MG tablet TAKE ONE TABLET BY MOUTH DAILY 7/28/20  Yes Rich Rosa MD   levETIRAcetam (KEPPRA) 500 MG tablet TAKE ONE TABLET BY MOUTH TWO TIMES A DAY. 5/26/20  Yes Rich Rosa MD   omeprazole (PRILOSEC) 20 MG delayed release capsule TAKE 1 CAPSULE BY MOUTH DAILY 4/7/20  Yes Rich Rosa MD   vitamin C (ASCORBIC ACID) 500 MG tablet Take 500 mg by mouth daily   Yes Historical Provider, MD   vitamin E 1000 units capsule Take 1,000 Units by mouth daily   Yes Historical Provider, MD   Multiple Vitamins-Minerals (THERAPEUTIC MULTIVITAMIN-MINERALS) tablet Take 1 tablet by mouth daily   Yes Historical Provider, MD   carvedilol (COREG) 12.5 MG tablet Take 12.5 mg by mouth 2 times daily (with meals)   Yes Historical Provider, MD   amiodarone (CORDARONE) 200 MG tablet Take 200 mg by mouth daily.    Yes Historical Provider, MD        CURRENT Medications:  amiodarone (CORDARONE) tablet 200 mg, Daily  calcium elemental (OSCAL) tablet 500 mg, Daily with breakfast  carvedilol (COREG) tablet 12.5 mg, BID WC  escitalopram (LEXAPRO) tablet 20 mg, Daily  levETIRAcetam (KEPPRA) tablet 500 mg, BID  therapeutic multivitamin-minerals 1 tablet, Daily  pantoprazole (PROTONIX) tablet 40 mg, QAM AC  potassium chloride (KLOR-CON M) extended release tablet 20 mEq, BID  furosemide (LASIX) injection 40 mg, BID  sodium chloride flush 0.9 % injection 10 mL, 2 times per day  sodium chloride flush 0.9 % injection 10 mL, PRN  acetaminophen (TYLENOL) tablet 650 mg, Q6H PRN    Or  acetaminophen (TYLENOL) suppository 650 mg, Q6H PRN  polyethylene glycol (GLYCOLAX) packet 17 g, Daily PRN  promethazine (PHENERGAN) tablet 12.5 mg, Q6H PRN    Or  ondansetron (ZOFRAN) injection 4 mg, Q6H PRN  enoxaparin (LOVENOX) injection 30 mg, Nightly        Allergies:  Entex lq [phenylephrine-guaifenesin] and Relafen [nabumetone]           Review of Systems: SEE HPI   · Constitutional: no unanticipated weight loss. There's been no change in energy level, sleep pattern, or activity level. No fevers, chills. · Eyes: No visual changes or diplopia. No scleral icterus. · ENT: No Headaches, hearing loss or vertigo. No mouth sores or sore throat. · Cardiovascular: No Chest pain, tightness or discomfort.   Shortness of breath. No Dyspnea on exertion, Orthopnea, Paroxysmal nocturnal dyspnea or breathlessness at rest.   No Palpitations.  No Syncope ('blackouts', 'faints', 'collapse') or dizziness. · Respiratory: No cough or wheezing, no sputum production. No hematemesis. · Gastrointestinal: No abdominal pain, appetite loss, blood in stools. No change in bowel or bladder habits. · Genitourinary: No dysuria, trouble voiding, or hematuria. · Musculoskeletal:  No gait disturbance, no joint complaints. · Integumentary: No rash or pruritis. · Neurological: No headache, diplopia, change in muscle strength, numbness or tingling. · Psychiatric: No anxiety or depression. · Endocrine: No temperature intolerance. No excessive thirst, fluid intake, or urination. No tremor. · Hematologic/Lymphatic: No abnormal bruising or bleeding, blood clots or swollen lymph nodes. · Allergic/Immunologic: No nasal congestion or hives.       Objective:     PHYSICAL EXAM:      Vitals:    09/12/20 1113   BP: 95/63   Pulse: 111   Resp: 18   Temp: 98 °F (36.7 °C)   SpO2: 92%    Weight: 192 lb 7.4 oz (87.3 kg)       General Appearance:  Alert, cooperative, no distress, appears stated age. Head:  Normocephalic, without obvious abnormality, atraumatic. Eyes:  Pupils equal and round. No scleral icterus. Mouth: Moist mucosa, no pharyngeal erythema. Nose: Nares normal. No drainage or sinus tenderness. Neck: Supple, symmetrical, trachea midline. No adenopathy. No tenderness/mass/nodules. No carotid bruit or elevated JVD. Lungs:   Respiratory Effort: Normal   Auscultation: Clear to auscultation bilaterally, respirations unlabored. No wheeze, rales   Chest Wall:  No tenderness or deformity. Cardiovascular:    Pulses  Palpation: normal   Ascultation: Regular rate, S1/ S2 normal.  Soft systolic murmur murmur, rub, or gallop. 2+ radial and pedal pulses, symmetric  Carotid  Femoral   Abdomen and Gastrointestinal:   Soft, non-tender, bowel sounds active. Liver and Spleen  Masses   Musculoskeletal: No muscle wasting  Back  Gait   Extremities:  Bilateral lower extremity edema       Skin: Inspection and palpation performed, no rashes or lesions. Pysch: Normal mood and affect.  Alert and oriented to time place person   Neurologic: Normal gross motor and sensory exam.       Labs     All labs have been reviewed    Lab Results   Component Value Date    WBC 8.3 09/12/2020    RBC 3.99 09/12/2020    HGB 12.4 09/12/2020    HCT 37.2 09/12/2020    MCV 93.2 09/12/2020    RDW 14.3 09/12/2020     09/12/2020     Lab Results   Component Value Date     09/12/2020    K 3.4 09/12/2020     09/12/2020    CO2 24 09/12/2020    BUN 17 09/12/2020    CREATININE 0.9 09/12/2020    GFRAA >60 09/12/2020    GFRAA >60 05/08/2013    AGRATIO 1.3 09/11/2020    LABGLOM >60 09/12/2020    LABGLOM 92.5 06/07/2011    GLUCOSE 87 09/12/2020    GLUCOSE 107 06/07/2011    PROT 6.4 09/11/2020    PROT 6.6 01/15/2012    CALCIUM 8.4 09/12/2020    BILITOT 0.6 09/11/2020    ALKPHOS 67 09/11/2020    AST 28 09/11/2020    ALT 14 09/11/2020     No results found for: [facility]  Lab Results   Component Value Date LABA1C 5.6 03/22/2017     Lab Results   Component Value Date    TROPONINI 0.01 09/11/2020       Cardiac, Vascular and Imaging Data all Personally Reviewed in Detail by Myself      EKG: Atrial fibrillation with rapid ventricular response with premature ventricular or aberrantly conducted complexesLeft axis deviationLeft bundle branch blockAbnormal ECGWhen compared with ECG of 01-SEP-2016 19:02,Minimal criteria for Septal infarct are no longer Present     Echocardiogram: Summary   Suboptimal image quality. Definity contrast administered. Ejection fraction is visually estimated to be 15-20%. There is severe diffuse hypokinesis. There is apical akinesis. Indeterminate diastolic function. No evidence of left ventricular mass or thrombus noted. Calcification of the mitral valve noted. Mitral annular calcification is present. Moderate mitral regurgitation is present. left atrium is moderately dilated. Mild to moderate tricuspid regurgitation. Other imaging:     Chest x-ray  Interval appearance since August 2020 of basilar opacities with small    effusions.  Findings may represent congestive heart failure or infection.               Assessment and Plan     --Patient has acute on chronic-Systolic Heart Failure. LVEF assessed by imaging is 20%  Strict I/O, fluid balance  Daily weight assessment  Fluid restriction  Diet education lower salt diet, exercise following weight  HF Nurse education   Follow Labs, check BNP and BMP  Continue diuretics with Lasix  Has an ICD. Continue beta-blocker should be on ACE inhibitor per guidelines. Paroxysmal atrial fibrillation needs rate control. Agree with beta-blocker and amiodarone. Cannot be on anticoagulation secondary to major bleed. History of coronary disease continue medical management. Long-term prognosis poor. Thank you for allowing us to participate in the care of Javier Paris.   Please do not hesitate to contact me if you have any questions.     Makayla Tineo MD, MPH    [facility], [address] [place] [address]  Ph: [phone]  Fax: [phone]    9/12/2020 12:29 PM

## 2020-09-12 NOTE — PLAN OF CARE
Problem: Falls - Risk of:  Goal: Will remain free from falls  Description: Will remain free from falls  Outcome: Ongoing     Problem: Skin Integrity:  Goal: Will show no infection signs and symptoms  Description: Will show no infection signs and symptoms  Outcome: Ongoing     Problem: OXYGENATION/RESPIRATORY FUNCTION  Goal: Patient will maintain patent airway  Outcome: Ongoing     Problem: HEMODYNAMIC STATUS  Goal: Patient has stable vital signs and fluid balance  Outcome: Ongoing

## 2020-09-12 NOTE — H&P
Principal Problem:    Acute on chronic systolic heart failure (HCC)  Active Problems:    Paroxysmal atrial fibrillation (HCC)    Essential hypertension, benign    Other hyperlipidemia    CAD (coronary artery disease)s/p [id number]    GERD (gastroesophageal reflux disease)    ICD (implantable cardioverter-defibrillator), dual, in situ    Seizure disorder ([facility]    Hypokalemia    History and Physical Dictated, #   [id number]    Time > 35 minutes reviewing chart and patient data, examining and interviewing patient, and discussing with nursing staff, family, etc.

## 2020-09-12 NOTE — PROGRESS NOTES
Nutrition Assessment     Type and Reason for Visit: Initial, Positive Nutrition Screen(poor po)    Nutrition Recommendations/Plan:   Monitor need to adjust diet texture  Encouraged pt & wife to contact Dietitian should any questions arise regarding diet     Nutrition Assessment:  Pt with pmh of CHF, CVA, HLD, HTN, adm r/t sob x 1 week. Found to be in acute on chronic heart failure. Diet adv to 2 gm Na / 1500 ml per day. Wife reported that pt needs soft foods. Pt very [place] but was able to  some of what was said during encounter. Wife present & reported that pt's intake is good. Recall revealed that r/t limitations physically with meal prep, some higher sodium choices have been used. Wife also reported that pt has not been eating well lately, so she found herself preparing things she knew he would eat. Wife does want to use up what is in pantry before buying the lower sodium choices. Discussed options for making what is available, lower in sodium or encouraged spreading out the use of these products over a period of time instead of all in 1 week. Wife agreed to consider. Feedback revealed knowledge of higher sodium choices. Wife reported getting carry-out around 1 time per week. Provided a written guide on what options are best when dining out. Recipes also provided to help ensure lower sodium choices. Discussed fluid modification & it's importance in helping avoid fluid overload. Wife very appreciative of information & agreed to be more diet compliant. Time 15 minutes. Malnutrition Assessment:  Malnutrition Status: No malnutrition    Nutrition Related Findings: +1 weeping edema to BLE. Noted BNP at 3,637.  Noted BM on 9/12      Current Nutrition Therapies:    DIET LOW SODIUM 2 GM; 1500 ml    Anthropometric Measures:  · Height: 5' 7\" (170.2 cm)  · Current Body Wt: 192 lb (87.1 kg)   · BMI: 30.1    Nutrition Diagnosis:   · Altered nutrition-related lab values related to cardiac dysfunction as evidenced by lab values      Nutrition Interventions:   Food and/or Nutrient Delivery:  Continue Current Diet(offer soft choices)  Nutrition Education/Counseling:  Education completed   Coordination of Nutrition Care:  Continued Inpatient Monitoring    Goals:  consume >/= to 50 %       Nutrition Monitoring and Evaluation:   Food/Nutrient Intake Outcomes:  Food and Nutrient Intake  Physical Signs/Symptoms Outcomes:  Biochemical Data, Constipation, Diarrhea, Fluid Status or Edema, Weight, Skin, Nutrition Focused Physical Findings, Chewing or Swallowing     Discharge Planning:     Too soon to determine     Electronically signed by Roxann Brunner RD, LD on 9/12/20 at 2:14 PM EDT    Contact: [phone]

## 2020-09-13 NOTE — PROGRESS NOTES
[facility]   Daily Progress Note      Admit Date:  9/11/2020    Reason for follow up visit: CHF    CC: \"I'm hanging in there. \"    81 y/o male with PMH significant for chronic atrial fibrillation, CAD/Ischemic cardiomyopathy, CHF and S/P ICD who was admitted with c/o SOB and LE edema. Echo this admit showed worsening LVEF and now 15-20%    Interval History:  Pt. seen and examined; records reviewed  BP stable. Denies chest pain  SOB tolerable. Lethargic today    Subjective:  Pt with no acute overnight cardiac events. Denies chest pain, SOB, cough, palpitations or dizziness    Review of Systems:   · Pt extremely tired and not wanting to answer    Objective:   /71   Pulse 120   Temp 97.6 °F (36.4 °C) (Oral)   Resp 18   Ht 5' 7\" (1.702 m)   Wt 183 lb 6.8 oz (83.2 kg)   SpO2 92%   BMI 28.73 kg/m²       Intake/Output Summary (Last 24 hours) at 9/13/2020 1109  Last data filed at 9/13/2020 0053  Gross per 24 hour   Intake 365 ml   Output 350 ml   Net 15 ml     Wt Readings from Last 3 Encounters:   09/13/20 183 lb 6.8 oz (83.2 kg)   07/29/20 177 lb (80.3 kg)   01/08/20 178 lb 12.8 oz (81.1 kg)       Physical Exam:  General: In no acute distress. Resting comfortably in bed. Oriented to person and place  Skin:  Warm and dry. Neck:  Supple. + JVD  Chest: Lungs with scattered wheezes  Cardiovascular:  Irreg; normal S1 and S2; I/VI systolic murmur  Abdomen:  soft, nontender, nondistended, +bowel sounds.    Extremities:  +1 bilateral lower leg edema; 1+ bilateral radial/DP/PT pulses    Medications:    docusate sodium  100 mg Oral Daily    digoxin  250 mcg Intravenous Once    [START ON 9/14/2020] digoxin  125 mcg Oral Daily    carvedilol  6.25 mg Oral BID WC    amiodarone  200 mg Oral Daily    calcium elemental  500 mg Oral Daily with breakfast    escitalopram  20 mg Oral Daily    levETIRAcetam  500 mg Oral BID    therapeutic multivitamin-minerals  1 tablet Oral Daily    pantoprazole  40 mg Oral QAM AC    potassium chloride  20 mEq Oral BID    furosemide  40 mg Intravenous BID    sodium chloride flush  10 mL Intravenous 2 times per day    enoxaparin  30 mg Subcutaneous Nightly       sodium chloride flush, acetaminophen **OR** acetaminophen, polyethylene glycol, promethazine **OR** ondansetron    Lab Data:  CBC:   Recent Labs     09/11/20  1205 09/12/20  0524   WBC 9.2 8.3   HGB 13.8 12.4*    217     BMP:    Recent Labs     09/11/20  1205 09/12/20  0524 09/13/20  0601    141 141   K 4.6 3.4* 3.3*   CO2 23 24 26   BUN 22* 17 16   CREATININE 1.0 0.9 0.8     LIVR:   Recent Labs     09/11/20  1205   AST 28   ALT 14     Results for Jose Lucia (MRN [id number]) as of 9/13/2020 13:14   Ref. Range 9/11/2020 12:05   Pro-BNP Latest Ref Range: 0 - 449 pg/mL 3,637 (H)   Troponin Latest Ref Range: <0.01 ng/mL 0.01       9/11/2020 CXR:  Interval appearance since August 2020 of basilar opacities with small    effusions.  Findings may represent congestive heart failure or infection.         Mild pulmonary edema. 9/12/2020 Echo:   Suboptimal image quality. Definity contrast administered. Ejection fraction is visually estimated to be 15-20%. There is severe diffuse hypokinesis. There is apical akinesis. Indeterminate diastolic function. No evidence of left ventricular mass or thrombus noted. Calcification of the mitral valve noted. Mitral annular calcification is present. Moderate mitral regurgitation is present. left atrium is moderately dilated. Mild to moderate tricuspid regurgitation. 9/11/2020 ECG:  Atrial fibrillation with rapid ventricular response with premature ventricular or aberrantly conducted complexes  Left axis deviation  Left bundle branch block  Abnormal ECG    Telemetry: Atrial fib with VR 's    Assessment/Plan:    1.  Acute on chronic systolic HF  -LVEF 85-51%; NYHA class IV  -continue with IV lasix and carvedilol  -not on ACE/ARB/aldactone d/t hypotension  -S/P ICD    2. Chronic atrial fibrillation  -RVR noted; now with improving rate control on BB and digoxin  -on amiodarone  (mainly rate control)  -despite elevated CHADS score, poor candidate for long term anticoagulation d/t major bleed    3. Hypokalemia  -secondary to diuresis  -K+ replacement    4. Coronary artery disease  -prior CABG  -no recurrent angina  -continue carvedilol  -not on ASA or statin (defer to primary team)  -continue medical management; not a candidate for invasive cardiac evaluation     ?  Palliative care consult (defer to PCP) given his chronic progessive cardiac disease      Electronically signed by BRENT Santamaria CNP on 9/13/2020 at 11:09 AM

## 2020-09-13 NOTE — PLAN OF CARE
Problem: Falls - Risk of:  Goal: Will remain free from falls  Description: Will remain free from falls  9/13/2020 1248 by Anita Marques RN  Outcome: Ongoing  9/13/2020 0107 by Aden Boo RN  Outcome: Ongoing   Fall risk assessment completed every shift. All precautions in place. Pt has call light within reach at all times. Room clear of clutter. Pt aware to call for assistance when getting up. Bed locked in lowest position, alarm engaged, call light within reach, will continue to monitor. Problem: Skin Integrity:  Goal: Will show no infection signs and symptoms  Description: Will show no infection signs and symptoms  9/13/2020 1248 by Anita Marques RN  Outcome: Ongoing  9/13/2020 0107 by Aden Boo RN  Outcome: Ongoing   Skin assessment completed every shift. Pt assessed for incontinence, appropriate barrier cream applied prn. Pt encouraged to turn/rotate every 2 hours. Assistance provided if pt unable to do so themselves. Problem: OXYGENATION/RESPIRATORY FUNCTION  Goal: Patient will maintain patent airway  9/13/2020 1248 by Anita Marques RN  Outcome: Ongoing  9/13/2020 0107 by Aden Boo RN  Outcome: Ongoing     Problem: HEMODYNAMIC STATUS  Goal: Patient has stable vital signs and fluid balance  9/13/2020 1248 by Anita Marques RN  Outcome: Ongoing  9/13/2020 0107 by Aden Boo RN  Outcome: Ongoing   Vitals completed q4h and assessed by RN. I&O charted and assessed per MD order. Pt tolerating adequate fluid intake. Problem: FLUID AND ELECTROLYTE IMBALANCE  Goal: Fluid and electrolyte balance are achieved/maintained  9/13/2020 1248 by Anita Marques RN  Outcome: Ongoing  9/13/2020 0107 by Aden Boo RN  Outcome: Ongoing   Pt electrolytes drawn and assessed per MD order. Replaced as needed per orders. I&O charted and assessed.   Pt tolerating adequate fluid intake    Problem: ACTIVITY INTOLERANCE/IMPAIRED MOBILITY  Goal: Mobility/activity is maintained

## 2020-09-13 NOTE — PROGRESS NOTES
Department of Internal Medicine  General Internal Medicine  Attending Progress Note    Chief Complaint:not feeling well      HPI:   SUBJECTIVE:  81 yo male with history of caf, CAD/sp cabg  and history of ischemic sm who presents in Pulmonary edema with SOB. Echo poor study now estimates decline in to 15-20 %  Mod MR. He is a poor historian. Per the nurse he has skin tears all over his body including his back and legs. He has continued to be in afib with RVR. Has hypotension yesterday and coreg was reduced since sys 80 supine. Past Medical History:   Diagnosis Date    Atrial fibrillation ([address]     Atrial flutter ([address]     CAD (coronary artery disease)     s/p cabg - 4 vessel (per pt)     Cerebral artery occlusion with cerebral infarction ([address]     [facility]     CTS (carpal tunnel syndrome)     Left    Hyperlipidemia     Hypertension     ICD (implantable cardioverter-defibrillator) in place     , dual chamber medtronic Dr. Eri Figueroa, primary prevention    Ischemic cardiomyopathy     EF 30% , 50-55% , 40-45%      Past Surgical History:   Procedure Laterality Date    CARDIAC DEFIBRILLATOR PLACEMENT Left 2010    Dual chamber Medtronic ICD placed by Dr. Sotero Grier      colonic divericulosis,int hemorrhoids-DR. benitez    CORONARY ARTERY BYPASS GRAFT  1986    CRANIOTOMY Right 2016    RIGHT FRONTAL PARIETAL TREPHINE CRANIOTOMY, DRAIN PLACMENT      Family History   Problem Relation Age of Onset    Heart Disease Mother     Cancer Father         lung    Diabetes Brother     Diabetes Daughter     Other Son         cad     Social History     Tobacco Use    Smoking status: Former Smoker     Last attempt to quit: 1983     Years since quittin.7    Smokeless tobacco: Never Used   Substance Use Topics    Alcohol use: No    Drug use: No       Prior to Admission medications    Medication Sig Start Date End Date Taking? Authorizing Provider   furosemide (LASIX) 40 MG tablet Take 40 mg by mouth 2 times daily   Yes Historical Provider, MD   potassium chloride (MICRO-K) 10 MEQ extended release capsule Take 20 mEq by mouth 2 times daily   Yes Historical Provider, MD   fluocinonide (LIDEX) 0.05 % ointment APPLY ONE APPLICATION TO RASH ON WRISTS/LOWER FOREARMS DAILY. 8/18/20  Yes Corey Vega MD   escitalopram (LEXAPRO) 20 MG tablet TAKE ONE TABLET BY MOUTH DAILY 7/28/20  Yes Corey Vega MD   levETIRAcetam (KEPPRA) 500 MG tablet TAKE ONE TABLET BY MOUTH TWO TIMES A DAY. 5/26/20  Yes Corey Vega MD   omeprazole (PRILOSEC) 20 MG delayed release capsule TAKE 1 CAPSULE BY MOUTH DAILY 4/7/20  Yes Corey Vega MD   vitamin C (ASCORBIC ACID) 500 MG tablet Take 500 mg by mouth daily   Yes Historical Provider, MD   vitamin E 1000 units capsule Take 1,000 Units by mouth daily   Yes Historical Provider, MD   Multiple Vitamins-Minerals (THERAPEUTIC MULTIVITAMIN-MINERALS) tablet Take 1 tablet by mouth daily   Yes Historical Provider, MD   carvedilol (COREG) 12.5 MG tablet Take 12.5 mg by mouth 2 times daily (with meals)   Yes Historical Provider, MD   amiodarone (CORDARONE) 200 MG tablet Take 200 mg by mouth daily. Yes Historical Provider, MD         ROS:  A comprehensive review of systems was negative except for: weakness sob    OBJECTIVE:      PHYSICAL:  Intake/Output:   Patient Vitals for the past 8 hrs:   BP Temp Temp src Pulse Resp SpO2 Weight   09/13/20 0920 -- -- -- -- 18 92 % --   09/13/20 0749 108/71 97.6 °F (36.4 °C) Oral 120 18 92 % --   09/13/20 0419 97/69 97.5 °F (36.4 °C) Oral 121 16 90 % --   09/13/20 0404 -- -- -- -- -- -- 183 lb 6.8 oz (83.2 kg)     I/O last 3 completed shifts: In: 12 [P.O.:365]  Out: 550 [Urine:550]  No intake/output data recorded.   VITALS:    /71   Pulse 120   Temp 97.6 °F (36.4 °C) (Oral)   Resp 18   Ht 5' 7\" (1.702 m)   Wt 183 lb 6.8 oz (83.2 kg)   SpO2 92% BMI 28.73 kg/m²     General Appearance: alert , well-developed and well-nourished, in no acute distress responds to questions with vague answers  Skin: warm and dry, no rash or erythema,  Multiple bruises on his back with skin tears  Head: normocephalic and atraumatic  Eyes: conjunctivae normal and sclera anicteric  ENT: hearing grossly normal bilaterally and sinuses non-tender  Neck: neck supple and non tender without mass, no thyromegaly or thyroid nodules, no cervical lymphadenopathy   Pulmonary/Chest: clear to auscultation bilaterally- decreased bs in bases,   Cardiovascular: irreg irreg rate in 120s in afib on monitor, normal S1 and S2, no gallops and no carotid bruits  Abdomen: soft, non-tender, non-distended, normal bowel sounds, no masses or organomegaly  Extremities: no cyanosis, clubbing or edema legs are wrapped  Musculoskeletal: no swollen joints and no tender joints,  Neurologic: coordination normal and speech normal, moves all 4 extremities    DATA:   Labs:  CBC:   Recent Labs     09/11/20  1205 09/12/20  0524   WBC 9.2 8.3   HGB 13.8 12.4*    217     BMP:    Recent Labs     09/11/20  1205 09/12/20  0524 09/13/20  0601    141 141   K 4.6 3.4* 3.3*    104 104   CO2 23 24 26   BUN 22* 17 16   CREATININE 1.0 0.9 0.8   GLUCOSE 130* 87 107*     POC GLUCOSE:  No results for input(s): POCGLU in the last 72 hours. Ca/Mg/Phos:   Recent Labs     09/11/20  1205 09/12/20  0524 09/13/20  0601   CALCIUM 9.0 8.4 8.7   MG  --  2.10  --      Hepatic:   Recent Labs     09/11/20  1205   AST 28   ALT 14   BILITOT 0.6   ALKPHOS 67     Troponin:   Recent Labs     09/11/20  1205   TROPONINI 0.01     ProBNP:   Recent Labs     09/11/20  1205   PROBNP 3,637*     Lipids: No results for input(s): CHOL, TRIG, HDL, LDLCALC, LABVLDL in the last 72 hours. ABGs: No results for input(s): PHART, PO2ART, ZLY0WXO in the last 72 hours. PT/INR: No results for input(s): PROTIME, INR in the last 72 hours.   APTT: No results for input(s): APTT in the last 72 hours. DIAGNOSTICS:  Xr Chest (2 Vw)    Result Date: 9/11/2020  Interval appearance since August 2020 of basilar opacities with small effusions. Findings may represent congestive heart failure or infection. Mild pulmonary edema. EKG : afib RVR LBBB pattern      ASSESSMENT AND PLAN    Principal Problem:    Acute on chronic systolic heart failure (HCC)  Plan: with worsening lvef, could be new CAD lesions, noncompliance with meds, worsening afib at home   Will stick with medical therapy and see how he does  Active Problems:    Atrial fibrillation with RVR ([facility]  Plan: has been on amiodarone as an op.   Will check thyroid function and add a little dig to see if it helps his rate    Essential hypertension, benign  Plan: has been low now    CAD (coronary artery disease)s/p CABG-1986  Plan: could have new stenosis in heart but not a cath candidate    Gait abnormality  Plan: to address    ICD (implantable cardioverter-defibrillator), dual, in situ  Plan: ongoing    Seizure disorder ([facility]  Plan: history, cont med    Hypokalemia  Plan: check mag and increase K dose        Appropriate diagnostic studies and consults ordered  Alyse Nichols MD

## 2020-09-14 NOTE — PLAN OF CARE
at optimum level for patient  9/14/2020 1137 by David Baldwin RN  Outcome: Ongoing  9/13/2020 2312 by Carmen Graff RN  Outcome: Ongoing   Assess pts mobility status and compare to baseline. Determine if pt uses crutches/cane/walker and make sure they are within reach. Provide bedside commode to conserve pt energy. Encourage adeqaute rest periods, especially before meals, other activities of daily living, exercise sessions, and ambulation. Keep items pt uses frequently within reach.

## 2020-09-14 NOTE — PROGRESS NOTES
Physical Therapy    Facility/Department: [address] PROGRESSIVE CARE  Initial Assessment    NAME: Bob Anderson  : 1928  MRN: [id number]    Date of Service: 2020    Discharge Recommendations:  Continue to assess pending progress   PT Equipment Recommendations  Other: Will monitor for any potential equipt needs. Assessment   Body structures, Functions, Activity limitations: Decreased functional mobility ; Decreased endurance  Assessment: 79 y/o male admit 2020 with CHF, A-Fib. PMH as noted including CHF, CAD, CABG, Ischemic Cardiomyopathy, ICD, Craniotomy (drain placed). PTA pt living with wife in [facility] with level entry. Pt reports independent with daily care and functional mobility (with walker) pta. Pt reports adequate assist/support upon d/c. Wife does amb with walker with apparent mobility issues (seen arriving at pt's bedside). Suspect pt ability to return home with family assist (pt reports other supportive family members). Would recommend Home PT Services. Will monitor pt's progress. Prognosis: Good;Fair  Decision Making: Medium Complexity  History: 79 y/o male admit 2020 with CHF, A-Fib. PMH as noted including CHF, CAD, CABG, Ischemic Cardiomyopathy, ICD, Craniotomy (drain placed). Exam: See above. Clinical Presentation: See above. Patient Education: Role of PT, POC, Need to call for assist, Safe use of Walker. Barriers to Learning: [place]. REQUIRES PT FOLLOW UP: Yes  Activity Tolerance  Activity Tolerance: Patient Tolerated treatment well;Patient limited by endurance       Patient Diagnosis(es): The primary encounter diagnosis was Longstanding persistent atrial fibrillation. A diagnosis of Acute on chronic congestive heart failure, unspecified heart failure type [facility]) was also pertinent to this visit.      has a past medical history of Atrial fibrillation ([address] Atrial flutter ([address] CAD (coronary artery disease), Cerebral artery occlusion with cerebral infarction ([address] CHF (congestive heart failure) ([facility] CTS (carpal tunnel syndrome), Hyperlipidemia, Hypertension, ICD (implantable cardioverter-defibrillator) in place, and Ischemic cardiomyopathy. has a past surgical history that includes Colonoscopy (2006); Colonoscopy (9/13); craniotomy (Right, 08/29/2016); Coronary artery bypass graft (1986); and Cardiac defibrillator placement (Left, 03/2010). Restrictions  Restrictions/Precautions  Restrictions/Precautions: Fall Risk  Vision/Hearing  Vision: Within Functional Limits  Hearing: Exceptions to [facility] Exceptions: Hard of hearing/hearing concerns;Bilateral hearing aid     Subjective  General  Chart Reviewed: Yes  Patient assessed for rehabilitation services?: Yes  Additional Pertinent Hx: 81 y/o male admit 9/11/2020 with CHF, A-Fib. PMH as noted including CHF, CAD, CABG, Ischemic Cardiomyopathy, ICD, Craniotomy (drain placed). Family / Caregiver Present: No  Referring Practitioner: Dr Justo Richard  Diagnosis: CHF, A-Fib. Follows Commands: Within Functional Limits  Subjective  Subjective: Pt pleasant, agreeable to PT Eval/Rx. Pain Screening  Patient Currently in Pain: Denies          Orientation  Orientation  Overall Orientation Status: Within Functional Limits  Social/Functional History  Social/Functional History  Lives With: Spouse(Wife Dareen Boast). )  Type of Home: (Condo.)  Home Layout: One level  Home Access: Level entry  Bathroom Shower/Tub: Walk-in shower  Bathroom Toilet: Handicap height  Bathroom Equipment: Grab bars in shower, Shower chair, Grab bars around toilet  Bathroom Accessibility: Accessible  Home Equipment: Rolling walker, Alert Button  ADL Assistance: [facility]: (Wife takes care of most homemaking needs.   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 Additional Comments: Wife provides assist/support although did observe wife arriving to visit pt today and does amb with Rogelio Ross (apparent mobility issues). Cognition        Objective     Observation/Palpation  Observation: Dressing intact R distal LE (pt reports chronic wound). Cellulitic appearance L Distal LE.    AROM RLE (degrees)  RLE AROM: WFL  AROM LLE (degrees)  LLE AROM : WFL  AROM RUE (degrees)  RUE AROM : WFL  RUE General AROM: Limit endrange overhead. AROM LUE (degrees)  LUE AROM : WFL  LUE General AROM: Limit endrange overhead. Strength RLE  Comment: Grossly 3+ 4-/5. Strength LLE  Comment: Grossly 3+ 4-/5. Bed mobility  Supine to Sit: Minimal assistance  Transfers  Sit to Stand: Contact guard assistance(With Walker. Cues for safe hand placement.)  Stand to sit: Contact guard assistance(With Walker. Cues for safe hand placement.)  Ambulation  Ambulation?: Yes  Ambulation 1  Surface: level tile  Device: Rolling Walker  Distance: Pt amb within hospital room setting 40' with Rolling Walker Close CGA. Flexed hips/knees, diminished step length/clearance. Foot flat gait. No specific LOB noted although alittle unsteady with turns/retroamb to chair. Plan   Plan  Times per week: 3-5x week while in acute care setting. Current Treatment Recommendations: Strengthening, Functional Mobility Training, Transfer Training, Gait Training, Safety Education & Training, Patient/Caregiver Education & Training  Safety Devices  Type of devices: Call light within reach, Chair alarm in place, Left in chair, Nurse notified      AM-PAC Score  AM-PAC Inpatient Mobility Raw Score : 16 (09/14/20 1424)  AM-PAC Inpatient T-Scale Score : 40.78 (09/14/20 1424)  Mobility Inpatient CMS 0-100% Score: 54.16 (09/14/20 1424)  Mobility Inpatient CMS G-Code Modifier : CK (09/14/20 1424)          Goals  Short term goals  Time Frame for Short term goals: Upon d/c acute care setting.   Short term goal 1: Bed Mob SBA. Short term goal 2: Transfers with assist device SBA/CGA. Short term goal 3: Amb with assist device 100' SBA/CGA. Patient Goals   Patient goals : Go home with wife.        Therapy Time   Individual Concurrent Group Co-treatment   Time In 1130         Time Out 1215         Minutes [address] [place] [address]

## 2020-09-14 NOTE — PROGRESS NOTES
Progress Note  Date:2020       Room:G0T-8288/5265-01  Patient Ed Dalal     YOB: 1928     Age:89 y.o. He is doing ok at rest and gets sob with exertion and denies dyspnea orthopnea or pnd  Has cough slightly and has no sputum  Has pain in legs when touched and not otherwise  Has no other cp gi or gu symptoms  Appetite is poor    Subjective    Subjective:  Symptoms:  He reports shortness of breath and cough. No chest pain. Review of Systems   Constitutional: Positive for activity change and appetite change. Negative for chills, fever and unexpected weight change. HENT: Negative for trouble swallowing (has spight cough with swallowing). Eyes: Negative for visual disturbance. Respiratory: Positive for cough and shortness of breath. Negative for chest tightness and wheezing. Cardiovascular: Positive for leg swelling. Negative for chest pain and palpitations. Gastrointestinal: Negative. Genitourinary: Negative. Neurological: Negative for dizziness, light-headedness and headaches. Objective         Vitals Last 24 Hours:  TEMPERATURE:  Temp  Av.7 °F (36.5 °C)  Min: 97.5 °F (36.4 °C)  Max: 98 °F (36.7 °C)  RESPIRATIONS RANGE: Resp  Av.1  Min: 16  Max: 22  PULSE OXIMETRY RANGE: SpO2  Av.1 %  Min: 90 %  Max: 94 %  PULSE RANGE: Pulse  Av.7  Min: 90  Max: 118  BLOOD PRESSURE RANGE: Systolic (11FEV), KCQ:649 , Min:104 , PIK:332   ; Diastolic (35XBN), LAS:08, Min:62, Max:76    I/O (24Hr): Intake/Output Summary (Last 24 hours) at 2020 0909  Last data filed at 2020 0207  Gross per 24 hour   Intake 900 ml   Output 1350 ml   Net -450 ml     Objective:  General Appearance:  Comfortable and in no acute distress. Vital signs: (most recent): Blood pressure 121/74, pulse 95, temperature 97.5 °F (36.4 °C), temperature source Oral, resp. rate 18, height 5' 7\" (1.702 m), weight 182 lb 15.7 oz (83 kg), SpO2 90 %. No fever.     Output: Producing urine and producing stool. HEENT: (Has elevated JVP and ha s no thyromegaly)    Lungs:  Normal effort and normal respiratory rate. He is not in respiratory distress. There are rales (at bases) and decreased breath sounds (left base ). No wheezes or rhonchi. Heart: Tachycardia. Irregular rhythm. No murmur or gallop. Abdomen: Abdomen is soft. Bowel sounds are normal.   There is no abdominal tenderness. There is no mass. There is no splenomegaly. There is no hepatomegaly. Extremities: There is dependent edema (1+both legs and has 2 cm superficial ulcers seen posterios aspects both sides and has no sorroundin erythema or swelling and base is pink. ). Neurological: Patient is alert and oriented to person, place and time. Pupils:  Pupils are equal, round, and reactive to light. Labs/Imaging/Diagnostics    Labs:  CBC:  Recent Labs     09/11/20  1205 09/12/20  0524   WBC 9.2 8.3   RBC 4.45 3.99*   HGB 13.8 12.4*   HCT 42.1 37.2*   MCV 94.7 93.2   RDW 14.6 14.3    217     CHEMISTRIES:  Recent Labs     09/12/20  0524 09/13/20  0601 09/14/20  0708    141 141   K 3.4* 3.3* 3.5    104 103   CO2 24 26 28   BUN 17 16 15   CREATININE 0.9 0.8 0.8   GLUCOSE 87 107* 96   MG 2.10 2.10 2.10     LIVER PROFILE:  Recent Labs     09/11/20  1205   AST 28   ALT 14   BILITOT 0.6   ALKPHOS 67       Imaging Last 24 Hours:  No results found.   Assessment//Plan           Hospital Problems           Last Modified POA    * (Principal) Acute on chronic systolic heart failure (HCC)-is betetr at rest and not sure he earl have much endurance with activity-has decent out put and still in positive fluid balance clinically -add aldactone  9/13/2020 Yes    Atrial fibrillation with RVR (HCC)-possibly due to hsi poor lv function and not due to his thyroid abnormalities and are mild  9/13/2020 Yes    Essential hypertension, benign-stable 9/12/2020 Yes    Other hyperlipidemia-no chnage 9/12/2020 Yes    CAD (coronary artery disease)s/p [id number] had another coronary event as per hsi echo findings and he has no symptoms. 9/12/2020 Yes    Gait abnormality-due to prior cns abnormalities ,age and decreased endurance due to his CHF -will start therapies.  9/12/2020 Yes    ICD (implantable cardioverter-defibrillator), dual, in situ-no change 9/12/2020 Yes    Overview Signed 9/14/2016 10:40 AM by Robb Fermin     Medelva dual chamber ICD with Optivol implanted by Dr Jacklyn Meza 3/16/10         Seizure disorder (HCC)-no seizures noted 9/12/2020 Yes    Hypokalemia-resolved  9/12/2020 Yes     9/13/2020 Yes              Assessment & Plan  Continue current medications  Will discuss with his wife about prognosis and code status and discussed with him also in detail and seem to understand and not sure how much   Electronically signed by Phoenix Crouch MD on 9/14/20 at 9:09 AM EDT

## 2020-09-14 NOTE — PROGRESS NOTES
[facility]   Daily Progress Note      Admit Date:  9/11/2020    Reason for follow up visit: CHF    CC: \"I'm cold and tired today. \"    79 y/o male with PMH significant for chronic atrial fibrillation, CAD/Ischemic cardiomyopathy, CHF and S/P ICD who was admitted with c/o SOB and LE edema. Echo this admit showed worsening LVEF and now 15-20%    Interval History:  Pt. seen and examined; records reviewed  BP stable  Lethargic today  SOB stable  Denies chest pain    Subjective:  Pt with no acute overnight cardiac events. Denies chest pain, cough, PND, palpitations or dizziness  + LE edema    Review of Systems:   + [place]; pt poor historian and unable to obtain    Objective:   /74   Pulse 95   Temp 97.5 °F (36.4 °C) (Oral)   Resp 18   Ht 5' 7\" (1.702 m)   Wt 182 lb 15.7 oz (83 kg)   SpO2 90%   BMI 28.66 kg/m²       Intake/Output Summary (Last 24 hours) at 9/14/2020 1106  Last data filed at 9/14/2020 1044  Gross per 24 hour   Intake 600 ml   Output 1350 ml   Net -750 ml     Wt Readings from Last 3 Encounters:   09/14/20 182 lb 15.7 oz (83 kg)   07/29/20 177 lb (80.3 kg)   01/08/20 178 lb 12.8 oz (81.1 kg)       Physical Exam:  General: In no acute distress. Resting comfortably and dozing off and on. + [place]  Skin:  Warm and dry. Neck:  Supple. + JVD  Chest: Lungs with diminished breath sounds and fine crackles bases . No wheezes/rhonchi/rales  Cardiovascular:  Irreg; I/VI systolic murmur  Abdomen:  soft, nontender, nondistended, +bowel sounds.    Extremities:  1+ bilateral lower lege edema; 1+ bilateral radial/DP pulses     Medications:    spironolactone  25 mg Oral BID    docusate sodium  100 mg Oral Daily    digoxin  125 mcg Oral Daily    carvedilol  6.25 mg Oral BID WC    amiodarone  200 mg Oral Daily    calcium elemental  500 mg Oral Daily with breakfast    escitalopram  20 mg Oral Daily    levETIRAcetam  500 mg Oral BID    therapeutic multivitamin-minerals  1 tablet Oral Daily    pantoprazole  40 mg Oral QAM AC    potassium chloride  20 mEq Oral BID    furosemide  40 mg Intravenous BID    sodium chloride flush  10 mL Intravenous 2 times per day    enoxaparin  30 mg Subcutaneous Nightly       sodium chloride flush, acetaminophen **OR** acetaminophen, polyethylene glycol, promethazine **OR** ondansetron    Lab Data:  CBC:   Recent Labs     09/11/20  1205 09/12/20  0524   WBC 9.2 8.3   HGB 13.8 12.4*    217     BMP:    Recent Labs     09/12/20  0524 09/13/20  0601 09/14/20  0708    141 141   K 3.4* 3.3* 3.5   CO2 24 26 28   BUN 17 16 15   CREATININE 0.9 0.8 0.8     LIVR:   Recent Labs     09/11/20  1205   AST 28   ALT 14     Results for Geroge Hashimoto (MRN [id number]) as of 9/14/2020 11:10   Ref. Range 9/11/2020 12:05 9/14/2020 07:08   Pro-BNP Latest Ref Range: 0 - 449 pg/mL 3,637 (H) 4,216 (H)   Troponin Latest Ref Range: <0.01 ng/mL 0.01        CXR: pending    9/12/2020 Echo:   Suboptimal image quality.   Definity contrast administered.   Ejection fraction is visually estimated to be 15-20%.   There is severe diffuse hypokinesis.   There is apical akinesis.   Indeterminate diastolic function.   No evidence of left ventricular mass or thrombus noted.   Calcification of the mitral valve noted.   Mitral annular calcification is present.   Moderate mitral regurgitation is present.   left atrium is moderately dilated.   Mild to moderate tricuspid regurgitation.     9/11/2020 ECG:  Atrial fibrillation with rapid ventricular response with premature ventricular or aberrantly conducted complexes  Left axis deviation  Left bundle branch block  Abnormal ECG    Telemetry: Atrial fib with VR 's    Assessment/Plan:    1. Acute on chronic systolic HF  -LVEF 21-65%; NYHA class IV  -continue with diuresis and carvedilol  -now on aldactone  -not on ACE/ARB d/t hypotension  -S/P ICD     2.  Chronic atrial fibrillation  -RVR yesterday and rate improed today  -continue carvedilol, amiodarone and now on digoxin (monitor for interaction with dig)  -despite elevated CHADS score, poor candidate for long term anticoagulation d/t major bleed     3. Hypokalemia  -normalized after replacement  -continue aldactone     4. Coronary artery disease  -underlying ischemic cardiomyopathy  -now with apical AK: ? New event  -prior CABG  -angina quiet  -continue carvedilol and aldactone  -would add ASA if ok with PCP  -?  Statin: defer to PCP  -continue medical management; not a candidate for invasive cardiac evaluation    Electronically signed by BRENT Cardozo CNP on 9/14/2020 at 11:06 AM

## 2020-09-14 NOTE — TELEPHONE ENCOUNTER
Called the number left but the mailbox is full could not leave a voicemail. But no from this office called the patient.

## 2020-09-14 NOTE — CARE COORDINATION
INITIAL CASE MANAGEMENT ASSESSMENT    Reviewed chart, met with patient to assess possible discharge needs. Explained Case Management role/services. SW met with patient and wife at bedside today. Patient gave this writer permission to speak freely in front of family. Living Situation: Patient resides in an apartment home with his wife and no pets. He stated that the apartment is on the first floor and he has no trouble getting in or out of the property at this time. ADLs: Prior to medical admission wife reports that she assists with cooking and laundry. She reports that they don't anticipate any needs at discharge. DME: Prior to medical admission wife reports that patient used a cane, walker, rollator, cane, lift chair, raised toilet seat, shower chair and had grab bars installed in their restroom. She reports that they don't anticipate any needs at discharge. PT/OT Recs: The Plan for Transition of Care is related to the following treatment goals:     PT recommends home with home care. Still waiting on OT recommendation. Will proceed with home care as this is the family's request.     The Patient and/or patient representative was provided with a choice of provider and agrees with the discharge plan. [x] Yes [] No    Freedom of choice list was provided with basic dialogue that supports the patient's individualized plan of care/goals, treatment preferences and shares the quality data associated with the providers. [x] Yes [] No    Patient has history with [facility]. SW sent referral this afternoon but we are waiting on a clarification on orders (does patient need OT/RN). Active Services: Prior to medical admission patient reports that he received life alert emergency response system from [facility]. SW called to confirm and they reported that he is in the [facility] program and he receives life alert and medical transportation.         Transportation: Prior to medical

## 2020-09-14 NOTE — TELEPHONE ENCOUNTER
----- Message from Kunal Huggins sent at 9/14/2020  4:08 PM EDT -----  Subject: Message to Provider    QUESTIONS  Information for Provider? Pt's wife returned call she said she missed from   the doctor regarding Pt. Please return call.  ---------------------------------------------------------------------------  --------------  CALL BACK INFO  What is the best way for the office to contact you? Do not leave any   message   patient will call back for answer  Preferred Call Back Phone Number? [phone]  ---------------------------------------------------------------------------  --------------  SCRIPT ANSWERS  Relationship to Patient? Other  Representative Name? Hong Armas  Is the Representative on the appropriate HIPAA document in Epic?  Yes

## 2020-09-14 NOTE — TELEPHONE ENCOUNTER
----- Message from Shukri Haddad sent at 9/14/2020 12:16 PM EDT -----  Subject: Message to Provider    QUESTIONS  Information for Provider? His wife Steve Hillman is calling the doctor back   migdaliause she missed a call from them about her    ---------------------------------------------------------------------------  --------------  CALL BACK INFO  What is the best way for the office to contact you? Do not leave any   message   patient will call back for answer  Preferred Call Back Phone Number? [phone]  ---------------------------------------------------------------------------  --------------  SCRIPT ANSWERS  Relationship to Patient? Other  Representative Name? Mary Hinson  Is the Representative on the appropriate HIPAA document in Epic?  Yes

## 2020-09-15 NOTE — TELEPHONE ENCOUNTER
Wife brought in advance directive. Wants [facility]  consult. You need to change the code status.

## 2020-09-15 NOTE — PROGRESS NOTES
Occupational Therapy   Occupational Therapy Initial Assessment and Tentative D/C    Date: 9/15/2020   Patient Name: Richard Cameron  MRN: [id number]     : 1928    Date of Service: 9/15/2020    Discharge Recommendations: Richard Cameron scored a 19/24 on the AM-PAC ADL Inpatient form. Current research shows that an AM-PAC score of 18 or greater is typically associated with a discharge to the patient's home setting. Based on the patient's AM-PAC score, and their current ADL deficits, it is recommended that the patient have 2-3 sessions per week of Occupational Therapy at d/c to increase the patient's independence. At this time, this patient demonstrates the endurance and safety to discharge home with [address] [place] [address] and a follow up treatment frequency of 2-3x/wk. Please see assessment section for further patient specific details. If patient discharges prior to next session this note will serve as a discharge summary. Please see below for the latest assessment towards goals. Continue to assess pending progress, 24 hour supervision or assist, 2-3 sessions per week  OT Equipment Recommendations  Equipment Needed: No  Other: pt with needed AD    Assessment   Performance deficits / Impairments: Decreased functional mobility ; Decreased balance;Decreased ADL status; Decreased strength;Decreased endurance;Decreased high-level IADLs  Assessment: Richard Cameron is a 80 y.o. male who presents to the emergency department for evaluation of shortness of breath. Patient lives at home with his wife, and is brought in by her today. Patient is alert, able to answer questions, reports he does not know why he is here. Therefore, wife provides majority of history. She reports that he has been having worsening shortness of breath, particular with exertion for about the past week. He has complained at home with shortness of breath, but has not complained of chest pain anytime.   Wife does report a cough productive of whitish mucus. He has had no fevers that she is aware of. He is not complain of any abdominal pain, has not had nausea or vomiting. She has noticed that the patient's scrotum and lower extremities have been more swollen. Today, she noticed that his lower extremities left slightly more red than usual.  Patient does have a history of atrial fibrillation and heart failure. He is on Lasix 40 mg twice daily, is not on an anticoagulant due to history of intracranial hemorrhage. PTA pt from home with wife where he was Ind with mobility and ADLs with use of RW. Anticipate pt needing up to Mod A for ADLs. Pt currently limited due to decreased overall endurance with ADLs and activity. Pt reports assist from wife as needed upon D/C. Pt will benefit from skilled OT services at this time. Anticipate pt safe to return home with 24hr assist, [address], and progression in therapy. Prognosis: Good  Decision Making: Medium Complexity  Exam: see above  Assistance / Modification: RW  OT Education: Plan of Care;OT Role;ADL Adaptive Strategies;Transfer Training  REQUIRES OT FOLLOW UP: Yes  Activity Tolerance  Activity Tolerance: Patient Tolerated treatment well  Safety Devices  Safety Devices in place: Yes  Type of devices: Bed alarm in place;Nurse notified;Call light within reach; Left in bed           Patient Diagnosis(es): The primary encounter diagnosis was Longstanding persistent atrial fibrillation. A diagnosis of Acute on chronic congestive heart failure, unspecified heart failure type [facility]) was also pertinent to this visit. has a past medical history of Atrial fibrillation ([facility] [address] Atrial flutter ([facility] [address] CAD (coronary artery disease), Cerebral artery occlusion with cerebral infarction [facility]), CHF (congestive heart failure) ([facility] [address] CTS (carpal tunnel syndrome), Hyperlipidemia, Hypertension, ICD (implantable cardioverter-defibrillator) in place, and Ischemic cardiomyopathy.    has a past surgical history that includes Colonoscopy (2006); level  Home Access: Level entry  Bathroom Shower/Tub: Walk-in shower  Bathroom Toilet: Handicap height  Bathroom Equipment: Grab bars in shower, Shower chair, Grab bars around toilet  Bathroom Accessibility: Accessible  Home Equipment: Rolling walker, Alert Button  ADL Assistance: [address]: (Wife takes care of most homemaking needs. 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 Additional Comments: Wife provides assist/support although did observe wife arriving to visit pt today and does amb with Tonja Grajeda (apparent mobility issues). Objective   Vision: Within Functional Limits  Hearing: Exceptions to [facility]  Hearing Exceptions: Hard of hearing/hearing concerns;Bilateral hearing aid    Orientation  Overall Orientation Status: Within Functional Limits     Balance  Sitting Balance: Supervision(seated EOB)  Standing Balance: Contact guard assistance(SBA/CGA and RW)  Functional Mobility  Functional - Mobility Device: Rolling Walker  Activity: To/from bathroom; Other(short household distances in room)  Assist Level: Contact guard assistance(SBA/CGA)  Functional Mobility Comments: no overt LOB; increased time to complete  Toilet Transfers  Toilet - Technique: Ambulating(RW)  Equipment Used: Grab bars  Toilet Transfer: Minimal assistance  Toilet Transfers Comments: pt with elevated toilet at home; Min A from standard toilet  Wheelchair Bed Transfers  Wheelchair/Bed - Technique: Ambulating(RW)  Equipment Used: Bed  Level of Asssistance: Contact guard assistance  ADL  Grooming: Stand by assistance(SBA for balance in stance at sink; pt washing hands)  LE Dressing:  Moderate assistance(assist to don/doff bilateral socks seated EOB)  Toileting: Contact guard assistance(CGA for balance in stance; pt able to complete pericare seated on toilet)  Tone RUE  RUE Tone: Normotonic  Tone LUE  LUE Tone: Normotonic  Coordination  Movements Are Fluid And Coordinated: Yes     Bed mobility  Supine to Sit: Minimal assistance  Sit to Supine: Minimal assistance  Scooting: Minimal assistance  Comment: pt reports sleeping in lift chair at baseline  Transfers  Sit to stand: Contact guard assistance  Stand to sit: Contact guard assistance  Transfer Comments: to/from RW     Cognition  Overall Cognitive Status: WFL                 LUE AROM (degrees)  LUE AROM : WFL  LUE General AROM: decreased shoulder AROM  RUE AROM (degrees)  RUE AROM : WFL  RUE General AROM: decreased shoulder AROM  LUE Strength  Gross LUE Strength: WFL  L Hand General: 4/5  RUE Strength  Gross RUE Strength: WFL  R Hand General: 4/5                   Plan   Plan  Times per week: 3-5x  Current Treatment Recommendations: Strengthening, Endurance Training, Balance Training, Functional Mobility Training, Safety Education & Training, Self-Care / ADL, Patient/Caregiver Education & Training, Gait Training      AM-PAC Score        AM-PAC Inpatient Daily Activity Raw Score: 19 (09/15/20 1017)  AM-PAC Inpatient ADL T-Scale Score : 40.22 (09/15/20 1017)  ADL Inpatient CMS 0-100% Score: 42.8 (09/15/20 1017)  ADL Inpatient CMS G-Code Modifier : CK (09/15/20 1017)    Goals  Short term goals  Time Frame for Short term goals: prior to D/C  Short term goal 1: complete functional mobility and transfers with supervision  Short term goal 2: complete bathing and dressing with supervision  Short term goal 3: complete toileting with supervision  Short term goal 4: complete grooming in stance at sink with supervision  Short term goal 5: tolerate B UE exercises for increased strength and endurance with ADLs  Long term goals  Time Frame for Long term goals : STG=LTG  Patient Goals   Patient goals : return home       Therapy Time   Individual Concurrent Group Co-treatment   Time In 4064 Time Out 1016         Minutes 38         Timed Code Treatment Minutes: 23 Minutes(15 minute eval)       Loreta Holden, OTR/L

## 2020-09-15 NOTE — PROGRESS NOTES
IM Progress Note      Subjective: The patient is a 80 y.o. male patient -apparently had heart burn till 1am today after taking potato soup for dinner  Has no problems now and has no dysphagia  Has no chest pain and denies nay orthopnea or pnd  Is able to go to bath room and feels less dyspnea  He has no other cp gi or gu symptoms      Review of Systems:  Review of Systems   Constitutional: Positive for activity change and appetite change. Negative for unexpected weight change. Respiratory: Positive for cough and shortness of breath. Negative for chest tightness and wheezing. Cardiovascular: Positive for leg swelling. Negative for chest pain and palpitations. Gastrointestinal: Negative. Genitourinary: Negative. Neurological: Negative for dizziness, light-headedness and headaches. Objective:    /73   Pulse 106   Temp 97.9 °F (36.6 °C) (Oral)   Resp 16   Ht 5' 7\" (1.702 m)   Wt 185 lb 3 oz (84 kg)   SpO2 90%   BMI 29.00 kg/m²     Intake/Output Summary (Last 24 hours) at 9/15/2020 1112  Last data filed at 9/15/2020 1020  Gross per 24 hour   Intake 780 ml   Output 1400 ml   Net -620 ml           Physical Exam  Vitals signs and nursing note reviewed. Constitutional:       General: He is not in acute distress. HENT:      Mouth/Throat:      Mouth: Mucous membranes are moist.      Pharynx: Oropharynx is clear. Eyes:      General: No scleral icterus. Extraocular Movements: Extraocular movements intact. Conjunctiva/sclera: Conjunctivae normal.      Pupils: Pupils are equal, round, and reactive to light. Neck:      Thyroid: No thyromegaly. Vascular: JVD present. No carotid bruit. Cardiovascular:      Rate and Rhythm: Tachycardia present. Rhythm irregular. Heart sounds: Normal heart sounds. No murmur. No gallop. Pulmonary:      Effort: No respiratory distress. Breath sounds: Rales present. No wheezing (at bases and has decaresed bs left base). and she has DPA for his health   She will come hear and sign papers today  Discussed with his nurse    Electronically signed by Salomón Sargent MD on 9/15/2020 at 11:12 AM

## 2020-09-15 NOTE — PROGRESS NOTES
Physical Therapy  Facility/Department: [address] PROGRESSIVE CARE  Daily Treatment Note  NAME: Keila Keen  : 1928  MRN: [id number]    Date of Service: 9/15/2020    Discharge Recommendations:  24 hour supervision or assist, Home with Home health PT, S Level 1, Patient would benefit from continued therapy after discharge   PT Equipment Recommendations  Equipment Needed: No  Other: pt has [address]: LEVEL 1 STANDARD     -Initial home health evaluation to occur within 24-48 hours, in patient home    -Home health agency to establish plan of care for patient over 60 day period    -Medication Reconciliation    -PCP Visit scheduled within seven days of discharge    -PT/OT to evaluate with goal of regaining prior level of functioning    -OT to evaluate if patient has [address] needs for personal care     Assessment   Body structures, Functions, Activity limitations: Decreased functional mobility ; Decreased endurance  Assessment: 79 y/o male admit 2020 with CHF, A-Fib. PMH as noted including CHF, CAD, CABG, Ischemic Cardiomyopathy, ICD, Craniotomy (drain placed). PTA pt living with wife in [facility] with level entry. Pt reports independent with daily care and functional mobility (with walker) pta. Pt reports adequate assist/support upon d/c. Recommend 24 hour supervision upon d/c and home health PT to continue to address functional mobility, activity tolerance and safety. Prognosis: Good;Fair  PT Education: Goals;PT Role;Plan of Care;Transfer Training;Gait Training;Functional Mobility Training  REQUIRES PT FOLLOW UP: Yes  Activity Tolerance  Activity Tolerance: Patient Tolerated treatment well;Patient limited by endurance     Patient Diagnosis(es): The primary encounter diagnosis was Longstanding persistent atrial fibrillation. A diagnosis of Acute on chronic congestive heart failure, unspecified heart failure type [facility]) was also pertinent to this visit.      has a past medical history of Atrial fibrillation ([facility] [address] Atrial flutter ([facility] [address] CAD (coronary artery disease), Cerebral artery occlusion with cerebral infarction [facility]), CHF (congestive heart failure) ([facility] [address] CTS (carpal tunnel syndrome), Hyperlipidemia, Hypertension, ICD (implantable cardioverter-defibrillator) in place, and Ischemic cardiomyopathy. has a past surgical history that includes Colonoscopy (2006); Colonoscopy (9/13); craniotomy (Right, 08/29/2016); Coronary artery bypass graft (1986); and Cardiac defibrillator placement (Left, 03/2010). Restrictions  Restrictions/Precautions  Restrictions/Precautions: Fall Risk     Social/Functional History  Lives With: Spouse(Wife Tran Hanna). )  Type of Home: (Condo.)  Home Layout: One level  Home Access: Level entry  Bathroom Shower/Tub: Walk-in shower  Bathroom Toilet: Handicap height  Bathroom Equipment: Grab bars in shower, Shower chair, Grab bars around toilet  Bathroom Accessibility: Accessible  Home Equipment: Rolling walker, Alert Button  ADL Assistance: [address]: (Wife takes care of most homemaking needs. 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 Additional Comments: Wife provides assist/support although did observe wife arriving to visit pt today and does amb with Maral Mcmahon (apparent mobility issues). Subjective   General  Chart Reviewed: Yes  Additional Pertinent Hx: 81 y/o male admit 9/11/2020 with CHF, A-Fib. PMH as noted including CHF, CAD, CABG, Ischemic Cardiomyopathy, ICD, Craniotomy (drain placed). Response To Previous Treatment: Patient with no complaints from previous session. Family / Caregiver Present: Yes(wife)  Referring Practitioner: Dr Laura Carrillo  Subjective  Subjective: Pt in bed upon arrival.  Agreeable to PT treatment. Denies pain.

## 2020-09-15 NOTE — CARE COORDINATION
INITIAL CASE MANAGEMENT ASSESSMENT (follow up)     PT/OT Recs: Home with home care PT/OT/RN. SW sent referral to [facility] yesterday and will follow up prior to discharge. PLAN/COMMENTS:   1)Obtain home care orders for PT/OT/RN. 2) Discharge to home with family.      SW provided contact information for patient or family to call with any questions. SW will follow and assist as needed.     Respectfully submitted,     MABEL Dunaway  [facility]   [phone]    Electronically signed by Beryle Olmstead, LISW-S on 9/15/2020 at 10:40 AM

## 2020-09-15 NOTE — PROGRESS NOTES
Leona 81   Daily Progress Note      Admit Date:  9/11/2020    Reason for follow up visit: CHF    CC: \"Feel much better today. Had a bad night after eating that soup for dinner. \"    81 y/o male with PMH significant for chronic atrial fibrillation, CAD/Ischemic cardiomyopathy, CHF and S/P ICD who was admitted with c/o SOB and LE edema. Echo this admit showed worsening LVEF and now 15-20%     Interval History:  Pt. seen and examined; records reviewed  BP reviewed. HR improving and in the 90's today (Atrial fib)  Denies chest pain or SOB this morning  Lying in bed    Subjective:  Pt with no acute overnight cardiac events. Denies chest pain this AM, SOB, cough, palpitations or dizziness  + LE edema improving  + [place]  + decreased appetite  Poor historian      Objective:   BP (!) 93/54   Pulse 94   Temp 98 °F (36.7 °C) (Oral)   Resp 18   Ht 5' 7\" (1.702 m)   Wt 185 lb 3 oz (84 kg)   SpO2 90%   BMI 29.00 kg/m²       Intake/Output Summary (Last 24 hours) at 9/15/2020 1234  Last data filed at 9/15/2020 1226  Gross per 24 hour   Intake 780 ml   Output 1400 ml   Net -620 ml     Wt Readings from Last 3 Encounters:   09/15/20 185 lb 3 oz (84 kg)   07/29/20 177 lb (80.3 kg)   01/08/20 178 lb 12.8 oz (81.1 kg)       Physical Exam:  General: In no acute distress. Elderly, frail and chronically ill in appearance. Lying comfortably in bed  Skin:  Warm and dry. Neck:  Supple. No JVD   Chest: Lungs with few upper airway wheezes that clear with cough  Cardiovascular:  Irreg; I/VI systolic murmur   Abdomen:  soft, nontender, nondistended, +bowel sounds. Extremities:  1+ bilateral ankle and very lower pretibial edema; R lower leg with gauze bandage. 1+ bilateral radial/DP/PT pulses.      Medications:    [START ON 9/16/2020] spironolactone  25 mg Oral Daily    metOLazone  2.5 mg Oral Daily    docusate sodium  100 mg Oral Daily    digoxin  125 mcg Oral Daily    carvedilol  6.25 mg Oral BID WC    amiodarone  200 mg Oral Daily    calcium elemental  500 mg Oral Daily with breakfast    escitalopram  20 mg Oral Daily    levETIRAcetam  500 mg Oral BID    therapeutic multivitamin-minerals  1 tablet Oral Daily    pantoprazole  40 mg Oral QAM AC    potassium chloride  20 mEq Oral BID    furosemide  40 mg Intravenous BID    sodium chloride flush  10 mL Intravenous 2 times per day    enoxaparin  30 mg Subcutaneous Nightly       sodium chloride flush, acetaminophen **OR** acetaminophen, polyethylene glycol, promethazine **OR** ondansetron    Lab Data:  CBC: No results for input(s): WBC, HGB, PLT in the last 72 hours. BMP:    Recent Labs     09/13/20  0601 09/14/20  0708 09/15/20  0517    141 140   K 3.3* 3.5 3.9   CO2 26 28 28   BUN 16 15 16   CREATININE 0.8 0.8 0.8     Results for Rolando Wright (MRN [id number]) as of 9/15/2020 12:41   Ref.  Range 9/11/2020 12:05 9/14/2020 07:08   Pro-BNP Latest Ref Range: 0 - 449 pg/mL 3,637 (H) 4,216 (H)   Troponin Latest Ref Range: <0.01 ng/mL 0.01      CXR 9/14/2020:  No significant change over the past 3 days with left greater than right    pleural effusions, pulmonary edema and bibasilar atelectasis.           9/12/2020 Echo:   Suboptimal image quality.   Definity contrast administered.   Ejection fraction is visually estimated to be 15-20%.   There is severe diffuse hypokinesis.   There is apical akinesis.   Indeterminate diastolic function.   No evidence of left ventricular mass or thrombus noted.   Calcification of the mitral valve noted.   Mitral annular calcification is present.   Moderate mitral regurgitation is present.   left atrium is moderately dilated.   Mild to moderate tricuspid regurgitation.     9/11/2020 ECG:  Atrial fibrillation with rapid ventricular response with premature ventricular or aberrantly conducted complexes  Left axis deviation  Left bundle branch block  Abnormal ECG    Telemetry: Atrial fib with VR 90's today    Assessment/Plan:    1. Acute on chronic systolic HF  -LVEF 01-08%; NYHA class IV  -continue IV diuretic, aldactone and carvedilol  -on metolazone (added today)  -not on ACE/ARB d/t hypotension  -S/P ICD     2. Chronic atrial fibrillation  -VR better controlled today  -continue carvedilol, amiodarone and now on digoxin (monitor for interaction with dig)  -despite elevated CHADS score, poor candidate for long term anticoagulation d/t major bleed     3. Hypokalemia  -normalized after replacement  -continue K[place] Con     4. Coronary artery disease  -underlying ischemic cardiomyopathy  -now with apical AK: ? New event  -prior CABG  -no recurrent angina  -continue carvedilol   -would add ASA if ok with PCP  -continue medical management; not a candidate for invasive cardiac evaluation    Long term prognosis poor.       Electronically signed by BRENT Menjivar CNP on 9/15/2020 at 12:34 PM

## 2020-09-15 NOTE — PLAN OF CARE
Problem: Falls - Risk of:  Goal: Will remain free from falls  Description: Will remain free from falls  9/15/2020 0031 by Jack Renee RN  Outcome: Ongoing     Problem: Skin Integrity:  Goal: Absence of new skin breakdown  Description: Absence of new skin breakdown  9/15/2020 0031 by Jack Renee RN  Outcome: Ongoing     Problem: ACTIVITY INTOLERANCE/IMPAIRED MOBILITY  Goal: Mobility/activity is maintained at optimum level for patient  9/15/2020 0031 by Jack Renee RN  Outcome: Ongoing

## 2020-09-16 NOTE — DISCHARGE INSTR - COC
Continuity of Care Form    Patient Name: Rosanna Eng   :  1928  MRN:  [id number]    Admit date:  2020  Discharge date:  2020    Code Status Order: DNR-CCA   Advance Directives:   [facility] Documentation       Date/Time Healthcare Directive Type of Healthcare Directive Copy in [address] Agent's Name Healthcare Agent's Phone Number    20 [id number]  Yes, patient has an advance directive for healthcare treatment  Durable power of  for health care  No, copy requested from family  Spouse  Devi Brock  --            Admitting Physician:  Violetta Mckee MD  PCP: Violetta Mckee MD    Discharging Nurse: [facility] Unit/Room#: B1N-0827/4993-73  Discharging Unit Phone Number: [phone]    Emergency Contact:   Extended Emergency Contact Information  Primary Emergency Contact: Sheeba Gaviria  Address: [place] [address].           [facility], [address] Phone: [phone]  Mobile Phone: [phone]  Relation: Spouse  Secondary Emergency Contact: Karoline Barnes  Address: [address] [place] [address] Phone: [phone]  Relation: Child    Past Surgical History:  Past Surgical History:   Procedure Laterality Date    CARDIAC DEFIBRILLATOR PLACEMENT Left 2010    Dual chamber Medtronic ICD placed by Dr. Aneta Harp COLONOSCOPY      COLONOSCOPY      colonic divericulosis,int hemorrhoids-DR. benitez    CORONARY ARTERY BYPASS GRAFT  1986    CRANIOTOMY Right 2016    RIGHT FRONTAL PARIETAL TREPHINE CRANIOTOMY, DRAIN PLACMENT       Immunization History:   Immunization History   Administered Date(s) Administered    Influenza Vaccine, unspecified formulation 10/12/2011, 2012, 2013, 2014, 2015, 10/04/2016    Influenza Virus Vaccine 2009, 2010, 10/04/2016    Influenza Whole 09/28/2010    Influenza, High Dose (Fluzone 65 yrs and older) 10/12/2011, 09/11/2012, 09/24/2013, 09/18/2014, 11/23/2015, 12/06/2017, 09/26/2018    Influenza, Triv, inactivated, subunit, adjuvanted, IM (Fluad 65 yrs and older) 09/18/2019    Meningococcal B, OMV (Bexsero) 02/25/2014, 12/17/2015, 05/24/2016, 05/23/2017    PPD Test 11/01/2011, 11/01/2011    Pneumococcal Conjugate 13-valent ([id number]) 01/18/2017    Pneumococcal Polysaccharide ([id number]) 06/07/2011, 05/15/2019       Active Problems:  Patient Active Problem List   Diagnosis Code    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 [facility] [employer] term current use of anticoagulant therapy Z79.01    Atrial fibrillation with RVR (HCC) I48.91    Congestive heart failure (HCC) I50.9    Encounter for long-term (current) use of other medications Z79.899    Essential hypertension, benign I10    Other hyperlipidemia E78.49    Cerebral artery occlusion with cerebral infarction (HCC) I63.50    Transient cerebral ischemia G45.9    CAD (coronary artery disease)s/p [id number] I25.10    Left lumbar pain M54.5    Contact dermatitis L25.9    Edema of extremities R60.0    Headache R51    Multiple skin tears T14. 8XXA    Right-sided chest wall pain-anterior and upper. R07.89    Gait abnormality R26.9    Rectal bleeding K62.5    Numbness and tingling in right hand-possible CTS R20.0, R20.2    Hematoma of thigh-right S70.10XA    Right thigh pain M79.651    Chronic cough R05    Orthostatic hypotension I95.1    Abrasions of multiple sites with infection-both knees T07. Alan Taylor, L08.9    Frequent falls R29.6    Paroxysmal atrial fibrillation (HCC) I48.0    ICD (implantable cardioverter-defibrillator), dual, in situ Z95.810    Abrasion of skin-left upper chest wall T14. 8XXA    Seizure disorder ([facility] [address] G40.909    Contusion of left upper extremity-upper arm S40.022A    Left shoulder tendonitis M75.82    Chronic bilateral back pain M54.9, G89.29    Stasis dermatitis of both legs I87.2    Herpes zoster without complication Y31.9    Allergic dermatitis L23.9    Acute bilateral low back pain without sciatica M54.5    Heel pain, chronic, left M79.672, G89.29    Acute on chronic systolic (congestive) heart failure (HCC) I50.23    Acute on chronic systolic heart failure (HCC) I50.23    Hypokalemia E87.6    Longstanding persistent atrial fibrillation I48.11    Permanent atrial fibrillation I48.21       Isolation/Infection:   Isolation            No Isolation          Patient Infection Status       None to display            Nurse Assessment:  Last Vital Signs: BP (!) 103/58   Pulse 126   Temp 98.2 °F (36.8 °C) (Oral)   Resp 20   Ht 5' 7\" (1.702 m)   Wt 179 lb 10.8 oz (81.5 kg)   SpO2 92%   BMI 28.14 kg/m²     Last documented pain score (0-10 scale): Pain Level: 0  Last Weight:   Wt Readings from Last 1 Encounters:   09/16/20 179 lb 10.8 oz (81.5 kg)     Mental Status:  oriented    IV Access:  - None    Nursing Mobility/ADLs:  Walking   Assisted  Transfer  Assisted  Bathing  Assisted  Dressing  Assisted  Toileting  Assisted  Feeding  Assisted  Med Admin  Assisted  Med Delivery   whole    Wound Care Documentation and Therapy:  Pressure Ulcer 08/23/16 Leg Right;Posterior; Lower (Active)   Number of days: 1485       Incision 08/29/16 Head Right (Active)   Number of days: 8061        Elimination:  Continence:   · Bowel: Yes  · Bladder: Yes  Urinary Catheter: None   Colostomy/Ileostomy/Ileal Conduit: No       Date of Last BM: 9/16/2020    Intake/Output Summary (Last 24 hours) at 9/16/2020 1347  Last data filed at 9/16/2020 0600  Gross per 24 hour   Intake 660 ml   Output 1325 ml   Net -665 ml     I/O last 3 completed shifts: In: 900 [P.O.:900]  Out: [facility] [Urine:1729]    Safety Concerns:      At Risk for Falls    Impairments/Disabilities:        Hard of hearing    Nutrition Therapy:  Current Nutrition Therapy:   - Oral Diet: Cardiac    Routes of Feeding: Oral  Liquids: Thin Liquids  Daily Fluid Restriction: yes - amount 1500  Last Modified Barium Swallow with Video (Video Swallowing Test): not done    Treatments at the Time of Hospital Discharge:   Respiratory Treatments:   Oxygen Therapy:  is not on home oxygen therapy. Ventilator:    - No ventilator support    Rehab Therapies: Physical Therapy and Occupational Therapy  Weight Bearing Status/Restrictions: No weight bearing restirctions  Other Medical Equipment (for information only, NOT a DME order):  walker  Other Treatments:     Patient's personal belongings (please select all that are sent with patient):  Hearing Aides right    RN SIGNATURE:  Electronically signed by Mohini Jordan RN on 9/18/20 at 11:02 AM EDT    CASE MANAGEMENT/SOCIAL WORK SECTION    Inpatient Status Date: 9/11/2020    Readmission Risk Assessment Score:  Readmission Risk              Risk of Unplanned Readmission:        14           Discharging to Facility/ Agency   · Name:  [facility]    · Address: [address] [place] [address], [address] [place] [address]., [place] [address]  · Phone: [phone]  · Fax: [phone]      / signature: Electronically signed by RC Lloyd, LSW on 9/16/20 at 1:47 PM EDT    PHYSICIAN SECTION    Prognosis:poor    Condition at Discharge: improved    Rehab Potential (if transferring to Rehab): fair to poor    Recommended Labs or Other Treatments After Discharge: hospice care    Physician Certification: I certify the above information and transfer of Romana Diana  is necessary for the continuing treatment of the diagnosis listed and that he requires hospice for greater 30 days.      Update Admission H&P: No change in H&P    PHYSICIAN SIGNATURE:  Electronically signed by Nando Lopes MD on 9/18/20 at 9:16 AM EDT

## 2020-09-16 NOTE — PROGRESS NOTES
Daughter in law at bedside. Social work notified that she wants to speak with them to get more of a dc plan in place.  Hospice vs home care and possibly needing equipment at home such as bsc, bed. etc

## 2020-09-16 NOTE — CARE COORDINATION
Sw spoke with pt family member Sammi Haley and pt at bedside. Discussed home with home care through [facility] for PT/ OT/ RN. Family in agreement. Family reports they have walker, electronic chair, shower chair, and raised toilet seat DME in the home. No further DME needs identified. Sammi Haley reports she and her siblings are available for support when needed. Pts wife will be with pt 24/7 to provide care. Discussed referral to [facility]. Family is in agreement at this time. Sw call to [facility] and referral made. [employer] sent to Dr. Dacia Paredes for [address] orders. Electronically signed by RC Rosas, RINKUW on 9/16/2020 at 12:14 PM    Sw received call from [facility] who report they are at capacity and can not take new referrals at this time. Sw call to pt spouse to discuss alternate [address] agency. Spouse would like referral made to [facility]. Referral faxed and call made to Emy Melvin with [facility].      Electronically signed by RC Rosas, LORENA on 9/16/2020 at 1:13 PM

## 2020-09-16 NOTE — PROGRESS NOTES
IM Progress Note      Subjective: The patient is in bed and denies any symptoms of chest pain orthopnea or pnd  Has slight cough and no sputum and not dyspnea at rest   Walks to bath room and denies dyspnea then also  Has no chest pain and denies any other cp gi or gu symptoms  Appetite is still poor    Review of Systems:  Review of Systems   Constitutional: Positive for activity change and appetite change. Negative for fever and unexpected weight change. HENT: Negative for trouble swallowing. Respiratory: Positive for cough and shortness of breath. Negative for chest tightness and wheezing. Cardiovascular: Negative for chest pain, palpitations and leg swelling (slightly). Gastrointestinal: Negative. Genitourinary: Negative. Neurological: Negative for dizziness, light-headedness and headaches. Objective:    /65   Pulse 94   Temp 98.3 °F (36.8 °C) (Oral)   Resp 14   Ht 5' 7\" (1.702 m)   Wt 179 lb 10.8 oz (81.5 kg)   SpO2 90%   BMI 28.14 kg/m²     Intake/Output Summary (Last 24 hours) at 9/16/2020 1730  Last data filed at 9/16/2020 1355  Gross per 24 hour   Intake 600 ml   Output 1425 ml   Net -825 ml           Physical Exam  Vitals signs and nursing note reviewed. Constitutional:       General: He is not in acute distress. Eyes:      General: No scleral icterus. Extraocular Movements: Extraocular movements intact. Conjunctiva/sclera: Conjunctivae normal.      Pupils: Pupils are equal, round, and reactive to light. Neck:      Thyroid: No thyromegaly. Vascular: JVD present. No carotid bruit. Cardiovascular:      Rate and Rhythm: Normal rate and regular rhythm. Heart sounds: Normal heart sounds. No murmur. No gallop. Pulmonary:      Effort: No respiratory distress. Breath sounds: Stridor: BS decreased lung bases. Rales (at bases) present. No wheezing. Abdominal:      General: Bowel sounds are normal. There is no distension.       Palpations: Abdomen is soft. There is no hepatomegaly, splenomegaly or mass. Tenderness: There is no abdominal tenderness. Musculoskeletal:      Right lower leg: Right lower leg edema: has sligth edema of legs. Lymphadenopathy:      Cervical: No cervical adenopathy. Neurological:      Mental Status: He is alert and oriented to person, place, and time. BMP:    Recent Labs     09/15/20  0517 09/16/20  0528    138   K 3.9 3.7    98*   CO2 28 29   BUN 16 15   CREATININE 0.8 0.8   GLUCOSE 111* 118*        Glucose:    Recent Labs     09/14/20  0708 09/15/20  0517 09/16/20  0528   GLUCOSE 96 111* 118*              Assessment/Plan:    Active Hospital Problems    Diagnosis Date Noted    Permanent atrial fibrillation [I48.21]-rate is slower today and dig level is 1.3 and monitor and is on amiodarone also          Hypokalemia [E87.6]-resolved 09/12/2020    Acute on chronic systolic heart failure (HCC) [I50.23]-still is in positive fluid balance clinically and had 800 cc negative fluid balance yesterday and continue diuretics 09/11/2020    Seizure disorder ([facility] [address] [G40.909]-no seizures 01/18/2017  09/14/2016    Gait abnormality [R26.9]-uses walker to ambulate 08/02/2013    CAD (coronary artery disease)s/p CABG-1986 [I25.10]-has no a nginal symptoms 05/23/2011  03/28/2011    Essential hypertension, benign [I10]-stable 03/28/2011  03/28/2011     Continue current medications  Hospice will see patient today  Further disposition and nature of care  to be determined.        Electronically signed by Jessa Talamantes MD on 9/16/2020 at 5:30 PM

## 2020-09-16 NOTE — PROGRESS NOTES
Page sent to Dr. Christy Palencia to see what time he is coming to round and to see about dc planning.

## 2020-09-16 NOTE — PROGRESS NOTES
[facility]   Daily Progress Note      Admit Date:  9/11/2020    Reason for follow up visit: CHF    CC: \"I feel okay today. \"    81 y/o male with PMH significant for chronic atrial fibrillation, CAD/Ischemic cardiomyopathy, CHF and S/P ICD who was admitted with c/o SOB and LE edema. Echo this admit showed worsening LVEF and now 15-20%. Pt now DNR-CCA    Interval History:  Pt. seen and examined; records reviewed  BP noted. Remains in atrial fib with better controlled VR  Denies chest pain. SOB improving  Net diuresis -2.8L since admit  Weak and not wanting to get out of bed    Subjective:  Pt with no acute overnight cardiac events. Denies chest pain, cough, palpitations or dizziness  + SOB  + [place]  + LE edema  + decreased appetite    Review of Systems:   · Pt poor historian    Objective:   /62   Pulse 102   Temp 98.1 °F (36.7 °C) (Oral)   Resp 18   Ht 5' 7\" (1.702 m)   Wt 179 lb 10.8 oz (81.5 kg)   SpO2 92%   BMI 28.14 kg/m²       Intake/Output Summary (Last 24 hours) at 9/16/2020 0930  Last data filed at 9/16/2020 0600  Gross per 24 hour   Intake 660 ml   Output 1725 ml   Net -1065 ml     Wt Readings from Last 3 Encounters:   09/16/20 179 lb 10.8 oz (81.5 kg)   07/29/20 177 lb (80.3 kg)   01/08/20 178 lb 12.8 oz (81.1 kg)       Physical Exam:  General: In no acute distress. Resting in bed. + [place]. Oriented x 3. Frail and chronically ill in appearance  Skin:  Warm and dry. Neck:  Supple. No JVD appreciated. Chest: Lungs with diminished breath sounds. No wheezes/rhonchi/rales  Cardiovascular:  Irreg; I/VI systolic murmur   Abdomen:  soft, nontender, nondistended, +bowel sounds. Extremities:  1+ bilateral pretibial edema. R lower leg with bandage in place.      Medications:    spironolactone  25 mg Oral Daily    metOLazone  2.5 mg Oral Daily    docusate sodium  100 mg Oral Daily    digoxin  125 mcg Oral Daily    carvedilol  6.25 mg Oral BID WC    amiodarone  200 mg Oral Daily    calcium elemental  500 mg Oral Daily with breakfast    escitalopram  20 mg Oral Daily    levETIRAcetam  500 mg Oral BID    therapeutic multivitamin-minerals  1 tablet Oral Daily    pantoprazole  40 mg Oral QAM AC    potassium chloride  20 mEq Oral BID    furosemide  40 mg Intravenous BID    sodium chloride flush  10 mL Intravenous 2 times per day    enoxaparin  30 mg Subcutaneous Nightly       sodium chloride flush, acetaminophen **OR** acetaminophen, polyethylene glycol, promethazine **OR** ondansetron    Lab Data:  CBC: No results for input(s): WBC, HGB, PLT in the last 72 hours. BMP:    Recent Labs     09/14/20  0708 09/15/20  0517 09/16/20  0528    140 138   K 3.5 3.9 3.7   CO2 28 28 29   BUN 15 16 15   CREATININE 0.8 0.8 0.8     Results for Janine Salinas (MRN [id number]) as of 9/16/2020 09:56   Ref. Range 9/11/2020 12:05 9/14/2020 07:08   Pro-BNP Latest Ref Range: 0 - 449 pg/mL 3,637 (H) 4,216 (H)   Troponin Latest Ref Range: <0.01 ng/mL 0.01      CXR 9/14/2020:  No significant change over the past 3 days with left greater than right    pleural effusions, pulmonary edema and bibasilar atelectasis.         9/12/2020 Echo:   Suboptimal image quality.   Definity contrast administered.   Ejection fraction is visually estimated to be 15-20%.   There is severe diffuse hypokinesis.   There is apical akinesis.   Indeterminate diastolic function.   No evidence of left ventricular mass or thrombus noted.   Calcification of the mitral valve noted.   Mitral annular calcification is present.   Moderate mitral regurgitation is present.   left atrium is moderately dilated.   Mild to moderate tricuspid regurgitation.     9/11/2020 ECG:  Atrial fibrillation with rapid ventricular response with premature ventricular or aberrantly conducted complexes  Left axis deviation  Left bundle branch block  Abnormal ECG    Telemetry: Atrial fib with VR 90's    Assessment/Plan:    1.  Acute on chronic systolic HF  -LVEF 15-20%; NYHA class IV  -continue lasix (change to po)  -continue metolazone, carvedilol and aldactone  -would decrease metolazone at discharge  -not on ACE/ARB d/t hypotension  -S/P ICD     2. Chronic atrial fibrillation  -VR now controlled  -continue rate control with carvedilol and amiodarone  -continue digoxin (would recommend decreasing dose at discharge to avoid toxicity with combination of amiodarone)  -despite elevated CHADS score, poor candidate for long term anticoagulation d/t major bleed     3. Hypokalemia  -improved and normalized  -continue K+ replacement     4. Coronary artery disease  -underlying ischemic cardiomyopathy  -now with apical AK: ? New event  -prior CABG  -no c/o chest pain  -continue carvedilol   -continue conservative medical management; not a candidate for any invasive cardiac testing      Will no longer follow as inpatient. Continue amiodarone, carvedilol, lasix, aldactone and KCL. Recommend decreasing dose of lanoxin and metolazone at discharge. Please call if needed. Thank you for allowing us to participate in Mr. Gaviria's care.         Electronically signed by BRENT Lopez CNP on 9/16/2020 at 9:30 AM

## 2020-09-16 NOTE — PLAN OF CARE
Problem: Falls - Risk of:  Goal: Will remain free from falls  Description: Will remain free from falls  Outcome: Ongoing     Problem: Skin Integrity:  Goal: Absence of new skin breakdown  Description: Absence of new skin breakdown  Outcome: Ongoing     Problem: OXYGENATION/RESPIRATORY FUNCTION  Goal: Patient will achieve/maintain normal respiratory rate/effort  Description: Respiratory rate and effort will be within normal limits for the patient  Outcome: Ongoing     Problem: ACTIVITY INTOLERANCE/IMPAIRED MOBILITY  Goal: Mobility/activity is maintained at optimum level for patient  Outcome: Ongoing

## 2020-09-16 NOTE — PROGRESS NOTES
Diagnosis(es): The primary encounter diagnosis was Longstanding persistent atrial fibrillation. A diagnosis of Acute on chronic congestive heart failure, unspecified heart failure type [facility]) was also pertinent to this visit. has a past medical history of Atrial fibrillation ([facility] [address] Atrial flutter ([facility] [address] CAD (coronary artery disease), Cerebral artery occlusion with cerebral infarction [facility]), CHF (congestive heart failure) ([facility] [address] CTS (carpal tunnel syndrome), Hyperlipidemia, Hypertension, ICD (implantable cardioverter-defibrillator) in place, and Ischemic cardiomyopathy. has a past surgical history that includes Colonoscopy (2006); Colonoscopy (9/13); craniotomy (Right, 08/29/2016); Coronary artery bypass graft (1986); and Cardiac defibrillator placement (Left, 03/2010). Restrictions  Restrictions/Precautions  Restrictions/Precautions: Fall Risk  Subjective   General  Chart Reviewed: Yes  Additional Pertinent Hx: 81 y/o male admit 9/11/2020 with CHF, A-Fib. PMH as noted including CHF, CAD, CABG, Ischemic Cardiomyopathy, ICD, Craniotomy (drain placed). Family / Caregiver Present: Yes(daughter in law)  Referring Practitioner: Dr Ale Bass: \"I can walk a little farther. \"  General Comment  Comments: Pt found supine in bed upon PT arrival.  Pt agreeable to therapy session. Pt reporting significant back itching, asking PT to scratch back. PT educating pt that at this time, she cannot scratch his back due to poor skin integrity (pt with open sores), however pt's daughter in law noted to scratch pt's back during session. Significant time spent during session educating pt's daughter in law on pt's current status and needs for physical mobility at d/c.  Per pt's daughter in law, she would like to have a discussion with social work if possible. Pt's wife has been present for most conversations regarding d/c planning and per daughter in law is not the most reliable historian.   Pt's wife is not able to provide assist required at home and family does not want pt to d/c to a SNF. Pt's family is able to piece together 24 hr supervision/assist if necessary. RN notified. Pain Screening  Patient Currently in Pain: Denies  Vital Signs  Patient Currently in Pain: Denies       Orientation     Cognition      Objective   Bed mobility  Supine to Sit: Moderate assistance(HOB slighty elevated, attempted to have pt use bedrails, however pt unable to utilize with [facility], assist for BLE and trunk placement, increased time to perform)  Scooting: Moderate assistance(to scoot to EOB in sitting prior to stand)  Transfers  Sit to Stand: Minimal Assistance; Moderate Assistance(Jian from EOB to RW, ModA from toilet to RW, verbal cues for hand placement, facilitation for glute activation and upright posture)  Stand to sit: Minimal Assistance(verbal cues for hand placement and eccentric control)  Ambulation  Ambulation?: Yes  Ambulation 1  Surface: level tile  Device: Rolling Walker  Assistance: Contact guard assistance  Quality of Gait: decreased jessica, forward trunk flexion, decreased B step height/length, poor RW management (able to correct with cues), B knee flexion in stance, no LOB noted  Distance: 10' (into bathroom) + 140'  Comments: Verbal cues for RW management, 140' distance limited by reported fatigue, pt's daughter in law reporting that pt is functioning significantly below baseline  Stairs/Curb  Stairs?: No     Balance  Comments: SBA for sitting balance on toilet for attempted urination. CGA for standing balance for pericare and hand hygiene at sink after toileting. Verbal cues to not abandon RW when at the sink. MaxA for pants management for time. Comment: Significant time spent during session discussing daughter in [address] [place] [address] concerns regarding d/c planning. Pt's daughter in law stating that the pt is doing much worse than baseline and his wife is unable to provide 24 hr care for him at this time.   Pt's family would like to take him home vs a SNF and can piece together 24 hr assist if needed. They would also like a hospice consult. RN notified. G-Code     OutComes Score                                                     AM-PAC Score  AM-PAC Inpatient Mobility Raw Score : 16 (09/16/20 1216)  AM-PAC Inpatient T-Scale Score : 40.78 (09/16/20 1216)  Mobility Inpatient CMS 0-100% Score: 54.16 (09/16/20 1216)  Mobility Inpatient CMS G-Code Modifier : CK (09/16/20 1216)          Goals  Short term goals  Time Frame for Short term goals: Upon d/c acute care setting. (goals ongoing as of 9/16)  Short term goal 1: Bed Mob SBA. Short term goal 2: Transfers with assist device SBA/CGA. Short term goal 3: Amb with assist device 100' SBA/CGA. Patient Goals   Patient goals : Go home with wife. Plan    Plan  Times per week: 3-5x week while in acute care setting. Current Treatment Recommendations: Strengthening, Functional Mobility Training, Transfer Training, Gait Training, Safety Education & Training, Patient/Caregiver Education & Training  Safety Devices  Type of devices: All fall risk precautions in place, Call light within reach, Chair alarm in place, Gait belt, Left in chair, Nurse notified  Restraints  Initially in place: No     Therapy Time   Individual Concurrent Group Co-treatment   Time In 1040         Time Out 1133         Minutes 53              Timed Code Treatment Minutes:  53    Total Treatment Minutes:  [address], PT    This note to serve as discharge summary if patient discharged before next session.

## 2020-09-17 NOTE — PLAN OF CARE
Problem: Falls - Risk of:  Goal: Will remain free from falls  Description: Will remain free from falls  9/17/2020 0015 by Cassandra Bates RN  Outcome: Ongoing     Problem: Falls - Risk of:  Goal: Absence of physical injury  Description: Absence of physical injury  9/17/2020 0015 by Cassandra Bates RN  Outcome: Ongoing     Problem: Skin Integrity:  Goal: Absence of new skin breakdown  Description: Absence of new skin breakdown  9/17/2020 0015 by Cassandra Bates RN  Outcome: Ongoing     Problem: OXYGENATION/RESPIRATORY FUNCTION  Goal: Patient will achieve/maintain normal respiratory rate/effort  Description: Respiratory rate and effort will be within normal limits for the patient  9/17/2020 0015 by Cassandra Bates RN  Outcome: Ongoing     Problem: ACTIVITY INTOLERANCE/IMPAIRED MOBILITY  Goal: Mobility/activity is maintained at optimum level for patient  9/17/2020 0015 by Cassandra Bates RN  Outcome: Ongoing

## 2020-09-17 NOTE — PROGRESS NOTES
MD was called at [phone] about having patient's defibrillator be deactivated before discharge tomorrow. Waiting for response.  Electronically signed by Niles Rivera RN on 9/17/2020 at 5:00 PM

## 2020-09-17 NOTE — PROGRESS NOTES
IM Progress Note      Subjective: The patient is lying in bed   No change in appetite and is very poor and weakness   Not sleeping well  Not walked to bath room today and using urinal  Has no chest pain orthopnea or pnd  Has no cough or wheezing  Has no chest pain and has no other cp gi or gu symptoms      Review of Systems:  Review of Systems   Constitutional: Negative for activity change, appetite change, fever and unexpected weight change. HENT: Negative for trouble swallowing. Respiratory: Positive for shortness of breath (with exertion). Negative for cough, chest tightness and wheezing. Cardiovascular: Negative for chest pain, palpitations and leg swelling. Genitourinary: Negative. Neurological: Negative for dizziness, light-headedness and headaches. Objective:    /62   Pulse 102   Temp 97.5 °F (36.4 °C) (Oral)   Resp 16   Ht 5' 7\" (1.702 m)   Wt 181 lb 7 oz (82.3 kg)   SpO2 91%   BMI 28.42 kg/m²     Intake/Output Summary (Last 24 hours) at 9/17/2020 1100  Last data filed at 9/17/2020 1036  Gross per 24 hour   Intake 540 ml   Output 1600 ml   Net -1060 ml       Physical Exam  Vitals signs and nursing note reviewed. Constitutional:       General: He is not in acute distress. Comments: Looks tired   Eyes:      General: No scleral icterus. Extraocular Movements: Extraocular movements intact. Conjunctiva/sclera: Conjunctivae normal.      Pupils: Pupils are equal, round, and reactive to light. Neck:      Thyroid: No thyromegaly. Vascular: JVD present. No carotid bruit. Cardiovascular:      Rate and Rhythm: Normal rate. Rhythm irregular. Heart sounds: Normal heart sounds. No murmur. No gallop. Pulmonary:      Effort: No respiratory distress. Breath sounds: Stridor: has decaresed bs both lungs. Rales (at bases) present. No wheezing. Abdominal:      General: Bowel sounds are normal. There is no distension. Palpations: Abdomen is soft. There is no hepatomegaly, splenomegaly or mass. Tenderness: There is no abdominal tenderness. Musculoskeletal:      Right lower leg: No edema. Left lower leg: No edema. Lymphadenopathy:      Cervical: No cervical adenopathy. Neurological:      Mental Status: He is alert and oriented to person, place, and time. CBC: No results for input(s): WBC, HGB, PLT in the last 72 hours. BMP:    Recent Labs     09/16/20  0528 09/17/20  0558    137   K 3.7 3.8   CL 98* 95*   CO2 29 31   BUN 15 15   CREATININE 0.8 0.8   GLUCOSE 118* 114*        Glucose:    Recent Labs     09/15/20  0517 09/16/20  0528 09/17/20  0558   GLUCOSE 111* 118* 114*              Assessment/Plan:    Active Hospital Problems    Diagnosis Date Noted    Permanent atrial fibrillation [I48.21]-heart rate is getting under control-continue amiodarone and digoxin           Hypokalemia [E87.6]-resolved 09/12/2020    Acute on chronic systolic heart failure (HCC) [I50.23]-clinically stable but still has fluid in lungs -has decent urine outputs with negative fluid balance with diuretics-but his exercise capacity is very poor and given his age and poor prognosis with EF 0f 15-20%,family is looking in to hospice care and not want him to go to SNF 09/11/2020    Seizure disorder ([facility] [address] [G40.909]-no seizures 01/18/2017    ICD (implantable cardioverter-defibrillator), dual, in situ [Z95.810]-no change 09/14/2016    Gait abnormality [R26.9]-due to multiple factors 08/02/2013    CAD (coronary artery disease)s/p CABG-1986 [I25.10]-has no symptoms of angina 05/23/2011  03/28/2011    Essential hypertension, benign [I10]-stable 03/28/2011  03/28/2011       Discussed in detail about his current issues and poor prognosis and she is discussing with Hospice now  She was also advised to discuss with cardiology before making final decisions  Discussed with his nurse also  Discussed with DR. Babatunde Luo also and explained to him what is going on with his care and to speak with his wife about his condition and he is going to speak with her.   Electronically signed by Corey Vega MD on 9/17/2020 at 11:00 AM

## 2020-09-17 NOTE — CARE COORDINATION
Sw met with pt and pt spouse in room to discuss discharge planning. Family is waiting to meet with [facility]ice [facility] to discuss hospice services.      Electronically signed by RC Gil, LORENA on 9/17/2020 at 10:15 AM

## 2020-09-17 NOTE — PROGRESS NOTES
Pt / family decision to enroll in hospice care noted. HF RNs will no longer follow. No specific HF metrics required in this setting.

## 2020-09-17 NOTE — PROGRESS NOTES
Physical Therapy  PT discontinued due to pt opting for hospice care with comfort measures. Hospice will handle any equipment needs. D/C planned for tomorrow. Last therapy note will serve as d/c summary.   Electronically signed by Rafaela Brown PT on 9/17/2020 at 4:13 PM

## 2020-09-18 NOTE — PLAN OF CARE
Problem: Falls - Risk of:  Goal: Will remain free from falls  Description: Will remain free from falls  9/17/2020 2247 by Samson García RN  Outcome: Ongoing  Goal: Absence of physical injury  Description: Absence of physical injury  9/17/2020 2247 by Samson García RN  Outcome: Ongoing     Problem: Skin Integrity:  Goal: Will show no infection signs and symptoms  Description: Will show no infection signs and symptoms  9/17/2020 2247 by Samson García RN  Outcome: Ongoing  Goal: Absence of new skin breakdown  Description: Absence of new skin breakdown  9/17/2020 2247 by Samson García RN  Outcome: Ongoing     Problem: OXYGENATION/RESPIRATORY FUNCTION  Goal: Patient will maintain patent airway  9/17/2020 2247 by Samson García RN  Outcome: Ongoing  Goal: Patient will achieve/maintain normal respiratory rate/effort  Description: Respiratory rate and effort will be within normal limits for the patient  9/17/2020 2247 by Samson García RN  Outcome: Ongoing     Problem: HEMODYNAMIC STATUS  Goal: Patient has stable vital signs and fluid balance  9/17/2020 2247 by Samson García RN  Outcome: Ongoing     Problem: FLUID AND ELECTROLYTE IMBALANCE  Goal: Fluid and electrolyte balance are achieved/maintained  9/17/2020 2247 by Samson García RN  Outcome: Ongoing     Problem: ACTIVITY INTOLERANCE/IMPAIRED MOBILITY  Goal: Mobility/activity is maintained at optimum level for patient  9/17/2020 2247 by Samson García RN  Outcome: Ongoing

## 2020-09-18 NOTE — TELEPHONE ENCOUNTER
Rajeev Carlos called letting us know that pt is now at home under hospice care. They would like to know if Dr Jason Mckenzie wants to still follow pt. If so please call and inform Rajeev Carlos. Pt has Heart Failure.

## 2020-09-18 NOTE — DISCHARGE SUMMARY
[address] [place] [address]                               DISCHARGE SUMMARY    PATIENT NAME: Latoya Mckinney                   :        1928  MED REC NO:   [id number]                          ROOM:       5265  ACCOUNT NO:   [de-identified]                           ADMIT DATE: 2020  PROVIDER:     Juana Jose MD                  DISCHARGE DATE:    DISCHARGE DATE:  2020    FINAL DIAGNOSES:  1. Acute-on-chronic systolic congestive heart failure with an ejection  fraction of 15% to 20% and wall motion abnormalities. 2.  Coronary artery disease with prior bypass surgery and permanent  atrial fibrillation. 3.  Hyperlipidemia. 4.  Status post ICD implantation. 5.  Hypokalemia. 6.  Gait abnormality due to multiple factors. 7.  _____  8. Essential hypertension. HISTORY OF PRESENT ILLNESS:  This patient presents with problems related  to his ischemic cardiomyopathy, ejection fraction in the past.  He was  admitted with a history of atrial fibrillation and flutter. He has also  cardiac defibrillator placed in the past.  He came with worsening  shortness of breath with exertion over the past week or two by himself. No chest pain, tightness, heaviness. Further details of the admission,  please review H and P dictated on admission. He was evaluated in the  emergency room, found to be in congestive heart failure and rapid atrial  fibrillation and he was admitted to the hospital for further evaluation. His examination showed the patient to have a heart rate in 110 to 120,  130, regularly irregular. Elevated JVP, 1+ edema with superficial skin  tears seen on the posterior aspect of both legs. Pulses were not very  well felt in the lower extremities. He had decreased breath sounds and  crackles in the bases, left more than right. Heart rate was irregular  and tachycardic, no murmur or gallop heard.   His white blood cell count  9, hemoglobin 13.8, platelets normal.  EKG showed atrial fibrillation  with RVR and left bundle branch block. BUN was 36, 37. Troponin 0.01. Rest of the CMP was unremarkable. Hepatic profile was normal.  His  Keppra level was 26. Potassium 3.4, creatinine of 0.9, BUN 17,  magnesium 2.1.  Echocardiogram was done in the hospital after admission. It showed an EF of 15% to 20%, which was dramatically reduced and also  showed severe diffuse hypokinesis and focal akinesia and intermediate  diastolic dysfunction. Impression was the patient had acute-on-chronic  systolic heart failure in a person with coronary artery disease and  rapid atrial fibrillation due to heart failure in a patient with the  above medical problems. HOSPITAL COURSE:  The patient was seen by Dr. Janay Duncan and was started on  IV Lasix, and subsequently added Aldactone and metolazone as his urine  output was not as it should be. During the course of the  hospitalization, his negative fluid balance was anywhere from 1600 to  1100 mL on a daily basis. His potassium was replaced. He was started  on digoxin in addition to amiodarone he is taking for his atrial fib and  this slowed his heart rate to 90 in that range, at times 100. His dig  level is 1.3 at this time. He started slow improvement in his symptoms,  but his chest x-rays continued to show pleural effusions and exam was  usually crackles and decreased breath sounds at bases. But on the day  of discharge, his right lung is clear. Left lung has shown decreased  breath sounds still. He has no crackles or wheezes. At this time, he  was given PT, OT and he was able to walk to the bathroom without much  shortness of breath and with a little help. He is also sleeping at  nights without any orthopnea. His congestive heart failure has  improved. Atrial fibrillation rate is reasonably controlled, but his  overall prognosis is poor.   At this time, discussions were made with his  wife and the patient. His wife does not want to take him to a skilled  nursing facility and she is not able to care for him, so hospice was  suggested and Hospice has seen the patient in consultation. Code status  was changed to DNR, and subsequently, the patient was advised about. The patient and the patient's family agreed with hospice care. His  edema has decreased. Skin sores have improved. He remains stable. His  oxygen saturations are very much under 91% to 92% on room air. His  blood pressure is still on the low side, so we have to decrease his  Coreg to 6.25 b.i.d. As mentioned, heart rate is in the 90s and 70s. At this time, it was felt he could be discharged home. At the time of  discharge, his prognosis is poor. CONDITION AT DISCHARGE:  Improved. REHABILITATION POTENTIAL:  Poor to fair. DISCHARGE MEDICATIONS:  1. Digoxin 125 mcg every other day. 2.  Docusate 100 mg daily. 3.  Metolazone 2.5 daily. 4.  MiraLax 1 tablespoon as needed for constipation. 5.  Spironolactone 25 mg daily. 6.  Carvedilol 12.5 mg tablets, half tablet twice a day. 7.  Silvadene ointment to skin sores over the lower extremities daily. 8.  Amiodarone 200 mg daily. 9.  Lexapro 20 mg daily. 10.  Lasix 40 mg two times a day. 11.  Keppra 500 mg b.i.d.  12.  Omeprazole 20 mg daily. 13.  Potassium chloride 20 mEq twice a day. 14.  Multivitamin 1 tablet a day. We did stop his baclofen, Lidex, vitamin C, and vitamin E while he was  in the hospital.  Prescriptions for digoxin, metolazone, spironolactone  were sent to [place] [id number].. Appropriate medication changes were  discussed. Diet, medication, activity discussed.         Cal Maurice MD    D: 09/18/2020 9:26:30       T: 09/18/2020 13:53:36     [email or web address]  Job#: [id number]     Doc#: [id number]    CC:

## 2020-09-18 NOTE — PROGRESS NOTES
IM Progress Note      Subjective: The patient is doing ok and denies any dyspnea and not much even walking to bath room  He has no orthopnea or pnd  Has no other  Cp gi or gu symptoms  Has slight cough with out sputum  Denies any pain at all      Review of Systems:  Review of Systems   Constitutional: Positive for activity change, appetite change and fatigue. Negative for fever and unexpected weight change. HENT: Negative for trouble swallowing. Respiratory: Positive for cough and shortness of breath (with exertion). Negative for chest tightness and wheezing. Cardiovascular: Negative for chest pain, palpitations and leg swelling. Gastrointestinal: Negative. Genitourinary: Negative. Neurological: Negative for dizziness, light-headedness and headaches. Objective:    BP 99/64   Pulse 101   Temp 97.2 °F (36.2 °C) (Axillary)   Resp 16   Ht 5' 7\" (1.702 m)   Wt 179 lb 7.3 oz (81.4 kg)   SpO2 91%   BMI 28.11 kg/m²     Intake/Output Summary (Last 24 hours) at 9/18/2020 0902  Last data filed at 9/18/2020 0600  Gross per 24 hour   Intake 490 ml   Output 1515 ml   Net -1025 ml           Physical Exam  Vitals signs and nursing note reviewed. Constitutional:       General: He is not in acute distress. Eyes:      General: No scleral icterus. Extraocular Movements: Extraocular movements intact. Conjunctiva/sclera: Conjunctivae normal.      Pupils: Pupils are equal, round, and reactive to light. Neck:      Thyroid: No thyromegaly. Vascular: JVD (even) present. No carotid bruit. Cardiovascular:      Rate and Rhythm: Normal rate. Rhythm irregular. Heart sounds: Normal heart sounds. No murmur. No gallop. Pulmonary:      Effort: No respiratory distress. Breath sounds: No wheezing or rales. Rhonchi: has decrease bs left base only and right lung is clear today. Musculoskeletal:      Right lower leg: No edema. Left lower leg: No edema.    Lymphadenopathy: Cervical: No cervical adenopathy. Neurological:      Mental Status: He is alert and oriented to person, place, and time. CBC: No results for input(s): WBC, HGB, PLT in the last 72 hours.   BMP:    Recent Labs     09/17/20  0558 09/18/20  0526    133*   K 3.8 3.6   CL 95* 91*   CO2 31 33*   BUN 15 17   CREATININE 0.8 0.8   GLUCOSE 114* 112*        Glucose:    Recent Labs     09/16/20  0528 09/17/20  0558 09/18/20  0526   GLUCOSE 118* 114* 112*              Assessment/Plan:    Active Hospital Problems    Diagnosis Date Noted    Permanent atrial fibrillation [I48.21]-rate controlled and Continue current medications  Will check dig level today          Hypokalemia [E87.6]-resolved 09/12/2020    Acute on chronic systolic heart failure (HCC) [I50.23]-seem to be improving slowly and has 1 liter negative fluid balance yesterday and continue current medications 09/11/2020    Seizure disorder ([facility] [address] [G40.909]-no seizure 01/18/2017    ICD (implantable cardioverter-defibrillator), dual, in situ [Z95.810]-no change and request cardiology to turns off defibrillator as he is going to be in Hospice care 09/14/2016    Gait abnormality [R26.9]-usee walker 08/02/2013    CAD (coronary artery disease)s/p [id number] [I25.10]-ha s no anginal symptoms 05/23/2011  03/28/2011    Essential hypertension, benign [I10]-controlled 03/28/2011  03/28/2011       Discussed with him and will discharge him home today with Hospice    Electronically signed by Corey Vega MD on 9/18/2020 at 9:02 AM